# Patient Record
Sex: FEMALE | Race: WHITE | NOT HISPANIC OR LATINO | Employment: OTHER | URBAN - METROPOLITAN AREA
[De-identification: names, ages, dates, MRNs, and addresses within clinical notes are randomized per-mention and may not be internally consistent; named-entity substitution may affect disease eponyms.]

---

## 2017-02-14 ENCOUNTER — HISTORICAL (OUTPATIENT)
Dept: LAB | Facility: HOSPITAL | Age: 82
End: 2017-02-14

## 2017-08-16 ENCOUNTER — HISTORICAL (OUTPATIENT)
Dept: LAB | Facility: HOSPITAL | Age: 82
End: 2017-08-16

## 2017-08-16 LAB
ABS NEUT (OLG): 3.22
ALBUMIN SERPL-MCNC: 3.4 GM/DL (ref 3.4–5)
ALBUMIN/GLOB SERPL: 0.9 RATIO (ref 1.1–2)
ALP SERPL-CCNC: 50 UNIT/L (ref 50–136)
ALT SERPL-CCNC: 21 UNIT/L (ref 12–78)
APPEARANCE, UA: CLEAR
AST SERPL-CCNC: 13 UNIT/L (ref 10–37)
BACTERIA #/AREA URNS AUTO: NORMAL /HPF
BASOPHILS # BLD AUTO: 0.03 X10(3)/MCL
BASOPHILS NFR BLD AUTO: 0.5 %
BILIRUB SERPL-MCNC: 0.6 MG/DL (ref 0.2–1)
BILIRUB UR QL STRIP: NEGATIVE
BILIRUBIN DIRECT+TOT PNL SERPL-MCNC: 0.16 MG/DL (ref 0.05–0.2)
BILIRUBIN DIRECT+TOT PNL SERPL-MCNC: 0.44 MG/DL
BUN SERPL-MCNC: 11 MG/DL (ref 7–18)
CALCIUM SERPL-MCNC: 9.2 MG/DL (ref 8.5–10.1)
CHLORIDE SERPL-SCNC: 105 MMOL/L (ref 98–107)
CHOLEST SERPL-MCNC: 209 MG/DL (ref 50–200)
CHOLEST/HDLC SERPL: 3 {RATIO} (ref 0–5)
CO2 SERPL-SCNC: 30.4 MMOL/L (ref 21–32)
COLOR UR: YELLOW
CREAT SERPL-MCNC: 0.74 MG/DL (ref 0.55–1.02)
EOSINOPHIL # BLD AUTO: 0.15 X10(3)/MCL
EOSINOPHIL NFR BLD AUTO: 2.7 %
ERYTHROCYTE [DISTWIDTH] IN BLOOD BY AUTOMATED COUNT: 13 %
GLOBULIN SER-MCNC: 3.8 GM/DL (ref 2.4–3.5)
GLUCOSE (UA): NEGATIVE
GLUCOSE SERPL-MCNC: 87 MG/DL (ref 74–106)
HCT VFR BLD AUTO: 40.6 % (ref 34–46)
HDLC SERPL-MCNC: 75 MG/DL (ref 35–60)
HGB BLD-MCNC: 13.3 GM/DL (ref 11.3–15.4)
HGB UR QL STRIP: NEGATIVE
IMM GRANULOCYTES # BLD AUTO: 0 10*3/UL (ref 0–0.1)
IMM GRANULOCYTES NFR BLD AUTO: 0 % (ref 0–1)
KETONES UR QL STRIP: NEGATIVE
LDLC SERPL CALC-MCNC: 123 MG/DL (ref 50–140)
LEUKOCYTE ESTERASE UR QL STRIP: NORMAL
LYMPHOCYTES # BLD AUTO: 1.66 X10(3)/MCL
LYMPHOCYTES NFR BLD AUTO: 30.3 %
MCH RBC QN AUTO: 30 PG (ref 27–33)
MCHC RBC AUTO-ENTMCNC: 32.8 GM/DL (ref 32–35)
MCV RBC AUTO: 91.6 FL (ref 81–97)
MONOCYTES # BLD AUTO: 0.42 X10(3)/MCL
MONOCYTES NFR BLD AUTO: 7.7 %
NEUTROPHILS # BLD AUTO: 3.22 X10(3)/MCL
NEUTROPHILS NFR BLD AUTO: 58.8 %
NITRITE UR QL STRIP.AUTO: NEGATIVE
PH UR STRIP: 7 [PH] (ref 4.6–8)
PLATELET # BLD AUTO: 363 X10(3)/MCL (ref 151–368)
PMV BLD AUTO: 10 FL
POTASSIUM SERPL-SCNC: 3.5 MMOL/L (ref 3.5–5.1)
PROT SERPL-MCNC: 7.2 GM/DL (ref 6.4–8.2)
PROT UR QL STRIP: NEGATIVE
RBC # BLD AUTO: 4.43 X10(6)/MCL (ref 3.9–5)
RBC #/AREA URNS HPF: NORMAL /[HPF]
SODIUM SERPL-SCNC: 142 MMOL/L (ref 136–145)
SP GR UR STRIP: 1.01 (ref 1–1.03)
SQUAMOUS EPITHELIAL, UA: NORMAL
TRIGL SERPL-MCNC: 53 MG/DL (ref 30–150)
TSH SERPL-ACNC: 2.09 MIU/ML (ref 0.35–3.75)
UROBILINOGEN UR STRIP-ACNC: 0.2
VLDLC SERPL CALC-MCNC: 11 MG/DL
WBC # SPEC AUTO: 5.48 X10(3)/MCL (ref 3.4–9.2)
WBC #/AREA URNS AUTO: NORMAL /HPF

## 2018-02-14 ENCOUNTER — HISTORICAL (OUTPATIENT)
Dept: LAB | Facility: HOSPITAL | Age: 83
End: 2018-02-14

## 2018-02-14 LAB
ALBUMIN SERPL-MCNC: 3.5 GM/DL (ref 3.4–5)
ALBUMIN/GLOB SERPL: 0.9 RATIO (ref 1.1–2)
ALP SERPL-CCNC: 53 UNIT/L (ref 46–116)
ALT SERPL-CCNC: 21 UNIT/L (ref 12–78)
AST SERPL-CCNC: 15 UNIT/L (ref 10–37)
BILIRUB SERPL-MCNC: 0.5 MG/DL (ref 0.2–1)
BILIRUBIN DIRECT+TOT PNL SERPL-MCNC: 0.1 MG/DL (ref 0–0.2)
BILIRUBIN DIRECT+TOT PNL SERPL-MCNC: 0.37 MG/DL
BUN SERPL-MCNC: 10 MG/DL (ref 7–18)
CALCIUM SERPL-MCNC: 9.7 MG/DL (ref 8.5–10.1)
CHLORIDE SERPL-SCNC: 104 MMOL/L (ref 98–107)
CHOLEST SERPL-MCNC: 200 MG/DL (ref 50–200)
CHOLEST/HDLC SERPL: 3 {RATIO} (ref 0–5)
CO2 SERPL-SCNC: 28.2 MMOL/L (ref 21–32)
CREAT SERPL-MCNC: 0.92 MG/DL (ref 0.55–1.02)
GLOBULIN SER-MCNC: 4 GM/DL (ref 2.4–3.5)
GLUCOSE SERPL-MCNC: 93 MG/DL (ref 74–106)
HDLC SERPL-MCNC: 72 MG/DL (ref 35–60)
LDLC SERPL CALC-MCNC: 114.2 MG/DL (ref 50–140)
POTASSIUM SERPL-SCNC: 3.9 MMOL/L (ref 3.5–5.1)
PROT SERPL-MCNC: 7.5 GM/DL (ref 6.4–8.2)
SODIUM SERPL-SCNC: 140 MMOL/L (ref 136–145)
TRIGL SERPL-MCNC: 69 MG/DL (ref 30–150)
TSH SERPL-ACNC: 4.1 MIU/ML (ref 0.35–3.75)
VLDLC SERPL CALC-MCNC: 14 MG/DL

## 2018-04-27 ENCOUNTER — HISTORICAL (OUTPATIENT)
Dept: LAB | Facility: HOSPITAL | Age: 83
End: 2018-04-27

## 2018-08-15 ENCOUNTER — HISTORICAL (OUTPATIENT)
Dept: LAB | Facility: HOSPITAL | Age: 83
End: 2018-08-15

## 2018-08-15 LAB
ABS NEUT (OLG): 3.15
ALBUMIN SERPL-MCNC: 3.4 GM/DL (ref 3.4–5)
ALBUMIN/GLOB SERPL: 0.9 RATIO (ref 1.1–2)
ALP SERPL-CCNC: 65 UNIT/L (ref 46–116)
ALT SERPL-CCNC: 20 UNIT/L (ref 12–78)
APPEARANCE, UA: CLEAR
AST SERPL-CCNC: 15 UNIT/L (ref 10–37)
BACTERIA SPEC CULT: ABNORMAL
BASOPHILS # BLD AUTO: 0.04 X10(3)/MCL
BASOPHILS NFR BLD AUTO: 0.7 %
BILIRUB SERPL-MCNC: 0.4 MG/DL (ref 0.2–1)
BILIRUB UR QL STRIP: NEGATIVE
BILIRUBIN DIRECT+TOT PNL SERPL-MCNC: 0.11 MG/DL (ref 0–0.2)
BILIRUBIN DIRECT+TOT PNL SERPL-MCNC: 0.29 MG/DL
BUN SERPL-MCNC: 11 MG/DL (ref 7–18)
CALCIUM SERPL-MCNC: 9.2 MG/DL (ref 8.5–10.1)
CHLORIDE SERPL-SCNC: 106 MMOL/L (ref 98–107)
CHOLEST SERPL-MCNC: 183 MG/DL (ref 50–200)
CHOLEST/HDLC SERPL: 2 {RATIO} (ref 0–5)
CO2 SERPL-SCNC: 27.9 MMOL/L (ref 21–32)
COLOR UR: YELLOW
CREAT SERPL-MCNC: 0.83 MG/DL (ref 0.55–1.02)
EOSINOPHIL # BLD AUTO: 0.19 X10(3)/MCL
EOSINOPHIL NFR BLD AUTO: 3.3 %
ERYTHROCYTE [DISTWIDTH] IN BLOOD BY AUTOMATED COUNT: 13 %
GLOBULIN SER-MCNC: 3.7 GM/DL (ref 2.4–3.5)
GLUCOSE (UA): NEGATIVE
GLUCOSE SERPL-MCNC: 82 MG/DL (ref 74–106)
HCT VFR BLD AUTO: 40.8 % (ref 34–46)
HDLC SERPL-MCNC: 76 MG/DL (ref 35–60)
HGB BLD-MCNC: 13.8 GM/DL (ref 11.3–15.4)
HGB UR QL STRIP: ABNORMAL
IMM GRANULOCYTES # BLD AUTO: 0.04 10*3/UL (ref 0–0.1)
IMM GRANULOCYTES NFR BLD AUTO: 0.7 % (ref 0–1)
KETONES UR QL STRIP: NEGATIVE
LDLC SERPL CALC-MCNC: 96 MG/DL (ref 50–140)
LEUKOCYTE ESTERASE UR QL STRIP: NEGATIVE
LYMPHOCYTES # BLD AUTO: 1.77 X10(3)/MCL
LYMPHOCYTES NFR BLD AUTO: 30.8 %
MCH RBC QN AUTO: 31.2 PG (ref 27–33)
MCHC RBC AUTO-ENTMCNC: 33.8 GM/DL (ref 32–35)
MCV RBC AUTO: 92.1 FL (ref 81–97)
MONOCYTES # BLD AUTO: 0.55 X10(3)/MCL
MONOCYTES NFR BLD AUTO: 9.6 %
NEUTROPHILS # BLD AUTO: 3.15 X10(3)/MCL
NEUTROPHILS NFR BLD AUTO: 54.9 %
NITRITE UR QL STRIP: NEGATIVE
PH UR STRIP: 6 [PH] (ref 4.6–8)
PLATELET # BLD AUTO: 361 X10(3)/MCL (ref 151–368)
PMV BLD AUTO: 9 FL
POTASSIUM SERPL-SCNC: 3.8 MMOL/L (ref 3.5–5.1)
PROT SERPL-MCNC: 7.1 GM/DL (ref 6.4–8.2)
PROT UR QL STRIP: NEGATIVE
RBC # BLD AUTO: 4.43 X10(6)/MCL (ref 3.9–5)
RBC #/AREA URNS HPF: ABNORMAL /[HPF]
SODIUM SERPL-SCNC: 141 MMOL/L (ref 136–145)
SP GR UR STRIP: 1.01 (ref 1–1.03)
SQUAMOUS EPITHELIAL, UA: ABNORMAL
T4 FREE SERPL-MCNC: 1.15 NG/DL (ref 0.76–1.46)
TRIGL SERPL-MCNC: 56 MG/DL (ref 30–150)
TSH SERPL-ACNC: 0.35 MIU/ML (ref 0.35–3.75)
UROBILINOGEN UR STRIP-ACNC: 0.2
VLDLC SERPL CALC-MCNC: 11 MG/DL
WBC # SPEC AUTO: 5.74 X10(3)/MCL (ref 3.4–9.2)
WBC #/AREA URNS HPF: ABNORMAL /HPF

## 2019-02-12 ENCOUNTER — HISTORICAL (OUTPATIENT)
Dept: LAB | Facility: HOSPITAL | Age: 84
End: 2019-02-12

## 2019-02-12 LAB
ALBUMIN SERPL-MCNC: 3.5 GM/DL (ref 3.4–5)
ALBUMIN/GLOB SERPL: 1 RATIO (ref 1.1–2)
ALP SERPL-CCNC: 56 UNIT/L (ref 46–116)
ALT SERPL-CCNC: 10 UNIT/L (ref 12–78)
AST SERPL-CCNC: 12 UNIT/L (ref 10–37)
BILIRUB SERPL-MCNC: 0.8 MG/DL (ref 0.2–1)
BILIRUBIN DIRECT+TOT PNL SERPL-MCNC: 0.18 MG/DL (ref 0–0.2)
BILIRUBIN DIRECT+TOT PNL SERPL-MCNC: 0.62 MG/DL
BUN SERPL-MCNC: 13 MG/DL (ref 7–18)
CALCIUM SERPL-MCNC: 9.6 MG/DL (ref 8.5–10.1)
CHLORIDE SERPL-SCNC: 106 MMOL/L (ref 98–107)
CHOLEST SERPL-MCNC: 209 MG/DL (ref 50–200)
CHOLEST/HDLC SERPL: 3 {RATIO} (ref 0–5)
CO2 SERPL-SCNC: 31.2 MMOL/L (ref 21–32)
CREAT SERPL-MCNC: 0.91 MG/DL (ref 0.55–1.02)
GLOBULIN SER-MCNC: 3.5 GM/DL (ref 2.4–3.5)
GLUCOSE SERPL-MCNC: 88 MG/DL (ref 74–106)
HDLC SERPL-MCNC: 68 MG/DL (ref 35–60)
LDLC SERPL CALC-MCNC: 126 MG/DL (ref 50–140)
POTASSIUM SERPL-SCNC: 3.9 MMOL/L (ref 3.5–5.1)
PROT SERPL-MCNC: 7 GM/DL (ref 6.4–8.2)
SODIUM SERPL-SCNC: 143 MMOL/L (ref 136–145)
T4 FREE SERPL-MCNC: 1.19 NG/DL (ref 0.76–1.46)
TRIGL SERPL-MCNC: 74 MG/DL (ref 30–150)
TSH SERPL-ACNC: 1.08 MIU/ML (ref 0.35–3.75)
VLDLC SERPL CALC-MCNC: 15 MG/DL

## 2019-08-16 ENCOUNTER — HISTORICAL (OUTPATIENT)
Dept: LAB | Facility: HOSPITAL | Age: 84
End: 2019-08-16

## 2019-08-16 LAB
ABS NEUT (OLG): 3.02
ALBUMIN SERPL-MCNC: 3.5 GM/DL (ref 3.4–5)
ALBUMIN/GLOB SERPL: 1 RATIO (ref 1.1–2)
ALP SERPL-CCNC: 59 UNIT/L (ref 46–116)
ALT SERPL-CCNC: 13 UNIT/L (ref 12–78)
AST SERPL-CCNC: 17 UNIT/L (ref 10–37)
BASOPHILS # BLD AUTO: 0.03 X10(3)/MCL
BASOPHILS NFR BLD AUTO: 0.6 %
BILIRUB SERPL-MCNC: 0.6 MG/DL (ref 0.2–1)
BILIRUBIN DIRECT+TOT PNL SERPL-MCNC: 0.16 MG/DL (ref 0–0.2)
BILIRUBIN DIRECT+TOT PNL SERPL-MCNC: 0.44 MG/DL
BUN SERPL-MCNC: 8 MG/DL (ref 7–18)
CALCIUM SERPL-MCNC: 9.7 MG/DL (ref 8.5–10.1)
CHLORIDE SERPL-SCNC: 107 MMOL/L (ref 98–107)
CHOLEST SERPL-MCNC: 209 MG/DL (ref 50–200)
CHOLEST/HDLC SERPL: 3 {RATIO} (ref 0–5)
CO2 SERPL-SCNC: 28.8 MMOL/L (ref 21–32)
CREAT SERPL-MCNC: 0.82 MG/DL (ref 0.55–1.02)
EOSINOPHIL # BLD AUTO: 0.24 X10(3)/MCL
EOSINOPHIL NFR BLD AUTO: 4.5 %
ERYTHROCYTE [DISTWIDTH] IN BLOOD BY AUTOMATED COUNT: 13 %
GLOBULIN SER-MCNC: 3.6 GM/DL (ref 2.4–3.5)
GLUCOSE SERPL-MCNC: 87 MG/DL (ref 74–106)
HCT VFR BLD AUTO: 41.3 % (ref 34–46)
HDLC SERPL-MCNC: 70 MG/DL (ref 35–60)
HGB BLD-MCNC: 13.7 GM/DL (ref 11.3–15.4)
IMM GRANULOCYTES # BLD AUTO: 0.01 10*3/UL (ref 0–0.1)
IMM GRANULOCYTES NFR BLD AUTO: 0.2 % (ref 0–1)
LDLC SERPL CALC-MCNC: 127 MG/DL (ref 50–140)
LYMPHOCYTES # BLD AUTO: 1.64 X10(3)/MCL
LYMPHOCYTES NFR BLD AUTO: 30.9 %
MCH RBC QN AUTO: 30.7 PG (ref 27–33)
MCHC RBC AUTO-ENTMCNC: 33.2 GM/DL (ref 32–35)
MCV RBC AUTO: 92.6 FL (ref 81–97)
MONOCYTES # BLD AUTO: 0.36 X10(3)/MCL
MONOCYTES NFR BLD AUTO: 6.8 %
NEUTROPHILS # BLD AUTO: 3.02 X10(3)/MCL
NEUTROPHILS NFR BLD AUTO: 57 %
PLATELET # BLD AUTO: 361 X10(3)/MCL (ref 140–450)
PMV BLD AUTO: 9 FL
POTASSIUM SERPL-SCNC: 4 MMOL/L (ref 3.5–5.1)
PROT SERPL-MCNC: 7.1 GM/DL (ref 6.4–8.2)
RBC # BLD AUTO: 4.46 X10(6)/MCL (ref 3.9–5)
SODIUM SERPL-SCNC: 143 MMOL/L (ref 136–145)
T4 FREE SERPL-MCNC: 1.15 NG/DL (ref 0.76–1.46)
TRIGL SERPL-MCNC: 59 MG/DL (ref 30–150)
TSH SERPL-ACNC: 2.41 MIU/ML (ref 0.35–3.75)
VLDLC SERPL CALC-MCNC: 12 MG/DL
WBC # SPEC AUTO: 5.3 X10(3)/MCL (ref 3.4–9.2)

## 2019-08-27 ENCOUNTER — HISTORICAL (OUTPATIENT)
Dept: RADIOLOGY | Facility: HOSPITAL | Age: 84
End: 2019-08-27

## 2020-02-11 ENCOUNTER — HISTORICAL (OUTPATIENT)
Dept: LAB | Facility: HOSPITAL | Age: 85
End: 2020-02-11

## 2020-02-11 LAB
ALBUMIN SERPL-MCNC: 4 GM/DL (ref 3.2–4.6)
ALBUMIN/GLOB SERPL: 1.1 RATIO (ref 1.1–2)
ALP SERPL-CCNC: 49 UNIT/L (ref 40–150)
ALT SERPL-CCNC: 12 UNIT/L (ref 0–55)
AST SERPL-CCNC: 14 UNIT/L (ref 5–34)
BILIRUB SERPL-MCNC: 0.5 MG/DL
BILIRUBIN DIRECT+TOT PNL SERPL-MCNC: 0.2 MG/DL (ref 0–0.5)
BILIRUBIN DIRECT+TOT PNL SERPL-MCNC: 0.3 MG/DL
BUN SERPL-MCNC: 8 MG/DL (ref 9.8–20.1)
CALCIUM SERPL-MCNC: 10.1 MG/DL (ref 8.4–10.2)
CHLORIDE SERPL-SCNC: 104 MMOL/L (ref 98–107)
CHOLEST SERPL-MCNC: 236 MG/DL
CHOLEST/HDLC SERPL: 4 {RATIO} (ref 0–5)
CO2 SERPL-SCNC: 29 MEQ/L (ref 23–31)
CREAT SERPL-MCNC: 0.84 MG/DL (ref 0.55–1.02)
GLOBULIN SER-MCNC: 3.5 GM/DL (ref 2.4–3.5)
GLUCOSE SERPL-MCNC: 93 MG/DL (ref 82–115)
HDLC SERPL-MCNC: 64 MG/DL
LDLC SERPL CALC-MCNC: 148 MG/DL (ref 50–140)
POTASSIUM SERPL-SCNC: 4.4 MMOL/L (ref 3.5–5.1)
PROT SERPL-MCNC: 7.5 GM/DL (ref 5.8–7.6)
SODIUM SERPL-SCNC: 143 MMOL/L (ref 136–145)
T4 FREE SERPL-MCNC: 1.17 NG/DL (ref 0.76–1.46)
TRIGL SERPL-MCNC: 122 MG/DL (ref 37–140)
TSH SERPL-ACNC: 3.74 UIU/ML (ref 0.35–4.94)
VLDLC SERPL CALC-MCNC: 24 MG/DL

## 2020-08-10 ENCOUNTER — HISTORICAL (OUTPATIENT)
Dept: LAB | Facility: HOSPITAL | Age: 85
End: 2020-08-10

## 2020-08-10 LAB
ABS NEUT (OLG): 4.15
ALBUMIN SERPL-MCNC: 3.7 GM/DL (ref 3.4–4.8)
ALBUMIN/GLOB SERPL: 1.2 RATIO (ref 1.1–2)
ALP SERPL-CCNC: 47 UNIT/L (ref 40–150)
ALT SERPL-CCNC: 10 UNIT/L (ref 0–55)
AST SERPL-CCNC: 13 UNIT/L (ref 5–34)
BASOPHILS # BLD AUTO: 0.04 X10(3)/MCL
BASOPHILS NFR BLD AUTO: 0.6 %
BILIRUB SERPL-MCNC: 0.6 MG/DL
BILIRUBIN DIRECT+TOT PNL SERPL-MCNC: 0.2 MG/DL (ref 0–0.5)
BILIRUBIN DIRECT+TOT PNL SERPL-MCNC: 0.4 MG/DL
BUN SERPL-MCNC: 13 MG/DL (ref 9.8–20.1)
CALCIUM SERPL-MCNC: 9.8 MG/DL (ref 8.4–10.2)
CHLORIDE SERPL-SCNC: 106 MMOL/L (ref 98–107)
CHOLEST SERPL-MCNC: 212 MG/DL
CHOLEST/HDLC SERPL: 3 {RATIO} (ref 0–5)
CO2 SERPL-SCNC: 30 MEQ/L (ref 23–31)
CREAT SERPL-MCNC: 0.8 MG/DL (ref 0.55–1.02)
EOSINOPHIL # BLD AUTO: 0.29 X10(3)/MCL
EOSINOPHIL NFR BLD AUTO: 4.1 %
ERYTHROCYTE [DISTWIDTH] IN BLOOD BY AUTOMATED COUNT: 12 %
GLOBULIN SER-MCNC: 3.1 GM/DL (ref 2.4–3.5)
GLUCOSE SERPL-MCNC: 94 MG/DL (ref 82–115)
HCT VFR BLD AUTO: 41.3 % (ref 34–46)
HDLC SERPL-MCNC: 61 MG/DL (ref 35–60)
HGB BLD-MCNC: 13.1 GM/DL (ref 11.3–15.4)
IMM GRANULOCYTES # BLD AUTO: 0.02 10*3/UL (ref 0–0.1)
IMM GRANULOCYTES NFR BLD AUTO: 0.3 % (ref 0–1)
LDLC SERPL CALC-MCNC: 130 MG/DL (ref 50–140)
LYMPHOCYTES # BLD AUTO: 1.88 X10(3)/MCL
LYMPHOCYTES NFR BLD AUTO: 26.8 %
MCH RBC QN AUTO: 29.9 PG (ref 27–33)
MCHC RBC AUTO-ENTMCNC: 31.7 GM/DL (ref 32–35)
MCV RBC AUTO: 94.3 FL (ref 81–97)
MONOCYTES # BLD AUTO: 0.63 X10(3)/MCL
MONOCYTES NFR BLD AUTO: 9 %
NEUTROPHILS # BLD AUTO: 4.15 X10(3)/MCL
NEUTROPHILS NFR BLD AUTO: 59.2 %
PLATELET # BLD AUTO: 409 X10(3)/MCL (ref 140–450)
PMV BLD AUTO: 9 FL
POTASSIUM SERPL-SCNC: 3.9 MMOL/L (ref 3.5–5.1)
PROT SERPL-MCNC: 6.8 GM/DL (ref 5.8–7.6)
RBC # BLD AUTO: 4.38 X10(6)/MCL (ref 3.9–5)
SODIUM SERPL-SCNC: 144 MMOL/L (ref 136–145)
T4 FREE SERPL-MCNC: 1.18 NG/DL (ref 0.7–1.48)
TRIGL SERPL-MCNC: 104 MG/DL (ref 37–140)
TSH SERPL-ACNC: 1.08 UIU/ML (ref 0.35–4.94)
VLDLC SERPL CALC-MCNC: 21 MG/DL
WBC # SPEC AUTO: 7.01 X10(3)/MCL (ref 3.4–9.2)

## 2021-02-09 ENCOUNTER — HISTORICAL (OUTPATIENT)
Dept: LAB | Facility: HOSPITAL | Age: 86
End: 2021-02-09

## 2021-02-09 LAB
ALBUMIN SERPL-MCNC: 3.7 GM/DL (ref 3.4–4.8)
ALBUMIN/GLOB SERPL: 1.1 RATIO (ref 1.1–2)
ALP SERPL-CCNC: 60 UNIT/L (ref 40–150)
ALT SERPL-CCNC: 10 UNIT/L (ref 0–55)
AST SERPL-CCNC: 14 UNIT/L (ref 5–34)
BILIRUB SERPL-MCNC: 0.6 MG/DL
BILIRUBIN DIRECT+TOT PNL SERPL-MCNC: 0.3 MG/DL
BILIRUBIN DIRECT+TOT PNL SERPL-MCNC: 0.3 MG/DL (ref 0–0.5)
BUN SERPL-MCNC: 12 MG/DL (ref 9.8–20.1)
CALCIUM SERPL-MCNC: 9.3 MG/DL (ref 8.4–10.2)
CHLORIDE SERPL-SCNC: 106 MMOL/L (ref 98–107)
CHOLEST SERPL-MCNC: 218 MG/DL
CHOLEST/HDLC SERPL: 3 {RATIO} (ref 0–5)
CO2 SERPL-SCNC: 30 MEQ/L (ref 23–31)
CREAT SERPL-MCNC: 0.79 MG/DL (ref 0.55–1.02)
GLOBULIN SER-MCNC: 3.3 GM/DL (ref 2.4–3.5)
GLUCOSE SERPL-MCNC: 85 MG/DL (ref 82–115)
HDLC SERPL-MCNC: 64 MG/DL (ref 35–60)
LDLC SERPL CALC-MCNC: 138 MG/DL (ref 50–140)
POTASSIUM SERPL-SCNC: 3.9 MMOL/L (ref 3.5–5.1)
PROT SERPL-MCNC: 7 GM/DL (ref 5.8–7.6)
SODIUM SERPL-SCNC: 144 MMOL/L (ref 136–145)
T4 FREE SERPL-MCNC: 1.06 NG/DL (ref 0.7–1.48)
TRIGL SERPL-MCNC: 80 MG/DL (ref 37–140)
TSH SERPL-ACNC: 3.45 UIU/ML (ref 0.35–4.94)
VLDLC SERPL CALC-MCNC: 16 MG/DL

## 2021-08-26 ENCOUNTER — HISTORICAL (OUTPATIENT)
Dept: LAB | Facility: HOSPITAL | Age: 86
End: 2021-08-26

## 2021-08-26 LAB
ABS NEUT (OLG): 2.61
ALBUMIN SERPL-MCNC: 3.7 GM/DL (ref 3.4–4.8)
ALBUMIN/GLOB SERPL: 1.1 RATIO (ref 1.1–2)
ALP SERPL-CCNC: 48 UNIT/L (ref 40–150)
ALT SERPL-CCNC: 13 UNIT/L (ref 0–55)
AST SERPL-CCNC: 14 UNIT/L (ref 5–34)
BASOPHILS # BLD AUTO: 0.04 X10(3)/MCL
BASOPHILS NFR BLD AUTO: 0.8 %
BILIRUB SERPL-MCNC: 0.5 MG/DL
BILIRUBIN DIRECT+TOT PNL SERPL-MCNC: 0.2 MG/DL (ref 0–0.5)
BILIRUBIN DIRECT+TOT PNL SERPL-MCNC: 0.3 MG/DL
BUN SERPL-MCNC: 13 MG/DL (ref 9.8–20.1)
CALCIUM SERPL-MCNC: 9.5 MG/DL (ref 8.4–10.2)
CHLORIDE SERPL-SCNC: 105 MMOL/L (ref 98–107)
CHOLEST SERPL-MCNC: 200 MG/DL
CHOLEST/HDLC SERPL: 3 {RATIO} (ref 0–5)
CO2 SERPL-SCNC: 30 MEQ/L (ref 23–31)
CREAT SERPL-MCNC: 0.83 MG/DL (ref 0.55–1.02)
EOSINOPHIL # BLD AUTO: 0.23 X10(3)/MCL
EOSINOPHIL NFR BLD AUTO: 4.4 %
ERYTHROCYTE [DISTWIDTH] IN BLOOD BY AUTOMATED COUNT: 13 %
GLOBULIN SER-MCNC: 3.4 GM/DL (ref 2.4–3.5)
GLUCOSE SERPL-MCNC: 88 MG/DL (ref 82–115)
HCT VFR BLD AUTO: 42 % (ref 34–46)
HDLC SERPL-MCNC: 63 MG/DL (ref 35–60)
HGB BLD-MCNC: 13.2 GM/DL (ref 11.3–15.4)
IMM GRANULOCYTES # BLD AUTO: 0.02 10*3/UL (ref 0–0.1)
IMM GRANULOCYTES NFR BLD AUTO: 0.4 % (ref 0–1)
LDLC SERPL CALC-MCNC: 126 MG/DL (ref 50–140)
LYMPHOCYTES # BLD AUTO: 1.83 X10(3)/MCL
LYMPHOCYTES NFR BLD AUTO: 35 %
MCH RBC QN AUTO: 29.5 PG (ref 27–33)
MCHC RBC AUTO-ENTMCNC: 31.4 GM/DL (ref 32–35)
MCV RBC AUTO: 94 FL (ref 81–97)
MONOCYTES # BLD AUTO: 0.5 X10(3)/MCL
MONOCYTES NFR BLD AUTO: 9.6 %
NEUTROPHILS # BLD AUTO: 2.61 X10(3)/MCL
NEUTROPHILS NFR BLD AUTO: 49.8 %
PLATELET # BLD AUTO: 363 X10(3)/MCL (ref 140–450)
PMV BLD AUTO: 9 FL
POTASSIUM SERPL-SCNC: 4 MMOL/L (ref 3.5–5.1)
PROT SERPL-MCNC: 7.1 GM/DL (ref 5.8–7.6)
RBC # BLD AUTO: 4.47 X10(6)/MCL (ref 3.9–5)
SODIUM SERPL-SCNC: 143 MMOL/L (ref 136–145)
T4 FREE SERPL-MCNC: 1.03 NG/DL (ref 0.7–1.48)
TRIGL SERPL-MCNC: 54 MG/DL (ref 37–140)
TSH SERPL-ACNC: 2.33 UIU/ML (ref 0.35–4.94)
VLDLC SERPL CALC-MCNC: 11 MG/DL
WBC # SPEC AUTO: 5.23 X10(3)/MCL (ref 3.4–9.2)

## 2022-03-03 ENCOUNTER — HISTORICAL (OUTPATIENT)
Dept: LAB | Facility: HOSPITAL | Age: 87
End: 2022-03-03

## 2022-03-03 LAB
ABS NEUT (OLG): 3.81
ALBUMIN SERPL-MCNC: 3.7 G/DL (ref 3.4–4.8)
ALBUMIN/GLOB SERPL: 1.1 {RATIO} (ref 1.1–2)
ALP SERPL-CCNC: 51 U/L (ref 40–150)
ALT SERPL-CCNC: 13 U/L (ref 0–55)
AST SERPL-CCNC: 15 U/L (ref 5–34)
BASOPHILS # BLD AUTO: 0.05 10*3/UL
BASOPHILS NFR BLD AUTO: 0.8 %
BILIRUB SERPL-MCNC: 0.6 MG/DL
BILIRUBIN DIRECT+TOT PNL SERPL-MCNC: 0.2 (ref 0–0.5)
BILIRUBIN DIRECT+TOT PNL SERPL-MCNC: 0.4
BUN SERPL-MCNC: 11 MG/DL (ref 9.8–20.1)
CALCIUM SERPL-MCNC: 9.5 MG/DL (ref 8.7–10.5)
CHLORIDE SERPL-SCNC: 106 MMOL/L (ref 98–107)
CHOLEST SERPL-MCNC: 238 MG/DL
CHOLEST/HDLC SERPL: 4 {RATIO} (ref 0–5)
CO2 SERPL-SCNC: 29 MMOL/L (ref 23–31)
CREAT SERPL-MCNC: 0.78 MG/DL (ref 0.55–1.02)
EOSINOPHIL # BLD AUTO: 0.22 10*3/UL
EOSINOPHIL NFR BLD AUTO: 3.6 %
ERYTHROCYTE [DISTWIDTH] IN BLOOD BY AUTOMATED COUNT: 13 %
GLOBULIN SER-MCNC: 3.4 G/DL (ref 2.4–3.5)
GLUCOSE SERPL-MCNC: 96 MG/DL (ref 82–115)
HCT VFR BLD AUTO: 40.9 % (ref 34–46)
HDLC SERPL-MCNC: 65 MG/DL (ref 35–60)
HEMOLYSIS INTERF INDEX SERPL-ACNC: 2
HGB BLD-MCNC: 13.3 G/DL (ref 11.3–15.4)
ICTERIC INTERF INDEX SERPL-ACNC: 1
IMM GRANULOCYTES # BLD AUTO: 0.02 10*3/UL (ref 0–0.1)
IMM GRANULOCYTES NFR BLD AUTO: 0.3 % (ref 0–1)
LDLC SERPL CALC-MCNC: 156 MG/DL (ref 50–140)
LIPEMIC INTERF INDEX SERPL-ACNC: -1
LYMPHOCYTES # BLD AUTO: 1.57 10*3/UL
LYMPHOCYTES NFR BLD AUTO: 25.4 %
MANUAL DIFF? (OHS): NO
MCH RBC QN AUTO: 30.1 PG (ref 27–33)
MCHC RBC AUTO-ENTMCNC: 32.5 G/DL (ref 32–35)
MCV RBC AUTO: 92.5 FL (ref 81–97)
MONOCYTES # BLD AUTO: 0.5 10*3/UL
MONOCYTES NFR BLD AUTO: 8.1 %
NEUTROPHILS # BLD AUTO: 3.81 10*3/UL
NEUTROPHILS NFR BLD AUTO: 61.8 %
PLATELET # BLD AUTO: 378 10*3/UL (ref 140–450)
PMV BLD AUTO: 9 FL
POTASSIUM SERPL-SCNC: 3.9 MMOL/L (ref 3.5–5.1)
PROT SERPL-MCNC: 7.1 G/DL (ref 5.8–7.6)
RBC # BLD AUTO: 4.42 10*6/UL (ref 3.9–5)
SODIUM SERPL-SCNC: 142 MMOL/L (ref 136–145)
T4 FREE SERPL-MCNC: 1.09 NG/DL (ref 0.7–1.48)
TRIGL SERPL-MCNC: 83 MG/DL (ref 37–140)
TSH SERPL-ACNC: 3.15 M[IU]/L (ref 0.35–4.94)
VLDLC SERPL CALC-MCNC: 17 MG/DL
WBC # SPEC AUTO: 6.17 10*3/UL (ref 3.4–9.2)

## 2022-09-01 ENCOUNTER — LAB VISIT (OUTPATIENT)
Dept: LAB | Facility: HOSPITAL | Age: 87
End: 2022-09-01
Attending: INTERNAL MEDICINE
Payer: MEDICARE

## 2022-09-01 DIAGNOSIS — E03.9 HYPOTHYROIDISM, ADULT: Primary | ICD-10-CM

## 2022-09-01 DIAGNOSIS — F41.9 ANXIETY: ICD-10-CM

## 2022-09-01 DIAGNOSIS — K21.9 GASTROESOPHAGEAL REFLUX DISEASE, UNSPECIFIED WHETHER ESOPHAGITIS PRESENT: ICD-10-CM

## 2022-09-01 DIAGNOSIS — E78.2 MIXED HYPERLIPIDEMIA: ICD-10-CM

## 2022-09-01 LAB
ALBUMIN SERPL-MCNC: 3.7 GM/DL (ref 3.4–4.8)
ALBUMIN/GLOB SERPL: 1.1 RATIO (ref 1.1–2)
ALP SERPL-CCNC: 51 UNIT/L (ref 40–150)
ALT SERPL-CCNC: 8 UNIT/L (ref 0–55)
AST SERPL-CCNC: 13 UNIT/L (ref 5–34)
BASOPHILS # BLD AUTO: 0.06 X10(3)/MCL (ref 0–0.2)
BASOPHILS NFR BLD AUTO: 0.9 %
BILIRUBIN DIRECT+TOT PNL SERPL-MCNC: 0.8 MG/DL
BUN SERPL-MCNC: 14 MG/DL (ref 9.8–20.1)
CALCIUM SERPL-MCNC: 10.4 MG/DL (ref 8.4–10.2)
CHLORIDE SERPL-SCNC: 106 MMOL/L (ref 98–107)
CHOLEST SERPL-MCNC: 220 MG/DL
CHOLEST/HDLC SERPL: 3 {RATIO} (ref 0–5)
CO2 SERPL-SCNC: 31 MMOL/L (ref 23–31)
CREAT SERPL-MCNC: 0.84 MG/DL (ref 0.55–1.02)
EOSINOPHIL # BLD AUTO: 0.22 X10(3)/MCL (ref 0–0.9)
EOSINOPHIL NFR BLD AUTO: 3.4 %
ERYTHROCYTE [DISTWIDTH] IN BLOOD BY AUTOMATED COUNT: 12.7 % (ref 11.5–17)
GFR SERPLBLD CREATININE-BSD FMLA CKD-EPI: >60 MLS/MIN/1.73/M2
GLOBULIN SER-MCNC: 3.5 GM/DL (ref 2.4–3.5)
GLUCOSE SERPL-MCNC: 94 MG/DL (ref 82–115)
HCT VFR BLD AUTO: 40.5 % (ref 37–47)
HDLC SERPL-MCNC: 68 MG/DL (ref 35–60)
HGB BLD-MCNC: 13.4 GM/DL (ref 12–16)
IMM GRANULOCYTES # BLD AUTO: 0.03 X10(3)/MCL (ref 0–0.04)
IMM GRANULOCYTES NFR BLD AUTO: 0.5 %
LDLC SERPL CALC-MCNC: 137 MG/DL (ref 50–140)
LYMPHOCYTES # BLD AUTO: 1.5 X10(3)/MCL (ref 0.6–4.6)
LYMPHOCYTES NFR BLD AUTO: 23.5 %
MCH RBC QN AUTO: 30.3 PG (ref 27–31)
MCHC RBC AUTO-ENTMCNC: 33.1 MG/DL (ref 33–36)
MCV RBC AUTO: 91.6 FL (ref 80–94)
MONOCYTES # BLD AUTO: 0.55 X10(3)/MCL (ref 0.1–1.3)
MONOCYTES NFR BLD AUTO: 8.6 %
NEUTROPHILS # BLD AUTO: 4 X10(3)/MCL (ref 2.1–9.2)
NEUTROPHILS NFR BLD AUTO: 63.1 %
NRBC BLD AUTO-RTO: 0 %
PLATELET # BLD AUTO: 370 X10(3)/MCL (ref 130–400)
PMV BLD AUTO: 9.4 FL (ref 7.4–10.4)
POTASSIUM SERPL-SCNC: 4.5 MMOL/L (ref 3.5–5.1)
PROT SERPL-MCNC: 7.2 GM/DL (ref 5.8–7.6)
RBC # BLD AUTO: 4.42 X10(6)/MCL (ref 4.2–5.4)
SODIUM SERPL-SCNC: 143 MMOL/L (ref 136–145)
T4 FREE SERPL-MCNC: 1.15 NG/DL (ref 0.7–1.48)
TRIGL SERPL-MCNC: 73 MG/DL (ref 37–140)
TSH SERPL-ACNC: 1.03 UIU/ML (ref 0.35–4.94)
VLDLC SERPL CALC-MCNC: 15 MG/DL
WBC # SPEC AUTO: 6.4 X10(3)/MCL (ref 4.5–11.5)

## 2022-09-01 PROCEDURE — 85025 COMPLETE CBC W/AUTO DIFF WBC: CPT

## 2022-09-01 PROCEDURE — 36415 COLL VENOUS BLD VENIPUNCTURE: CPT

## 2022-09-01 PROCEDURE — 80053 COMPREHEN METABOLIC PANEL: CPT

## 2022-09-01 PROCEDURE — 84439 ASSAY OF FREE THYROXINE: CPT

## 2022-09-01 PROCEDURE — 80061 LIPID PANEL: CPT

## 2022-09-01 PROCEDURE — 84443 ASSAY THYROID STIM HORMONE: CPT

## 2023-03-02 ENCOUNTER — LAB VISIT (OUTPATIENT)
Dept: LAB | Facility: HOSPITAL | Age: 88
End: 2023-03-02
Attending: INTERNAL MEDICINE
Payer: MEDICARE

## 2023-03-02 DIAGNOSIS — E03.9 MYXEDEMA HEART DISEASE: Primary | ICD-10-CM

## 2023-03-02 DIAGNOSIS — K21.9 GASTROESOPHAGEAL REFLUX DISEASE, UNSPECIFIED WHETHER ESOPHAGITIS PRESENT: ICD-10-CM

## 2023-03-02 DIAGNOSIS — E78.2 MIXED HYPERLIPIDEMIA: ICD-10-CM

## 2023-03-02 DIAGNOSIS — I51.9 MYXEDEMA HEART DISEASE: Primary | ICD-10-CM

## 2023-03-02 LAB
ALBUMIN SERPL-MCNC: 3.8 G/DL (ref 3.4–4.8)
ALBUMIN/GLOB SERPL: 1.1 RATIO (ref 1.1–2)
ALP SERPL-CCNC: 51 UNIT/L (ref 40–150)
ALT SERPL-CCNC: 8 UNIT/L (ref 0–55)
AST SERPL-CCNC: 13 UNIT/L (ref 5–34)
BILIRUBIN DIRECT+TOT PNL SERPL-MCNC: 0.8 MG/DL
BUN SERPL-MCNC: 11 MG/DL (ref 9.8–20.1)
CALCIUM SERPL-MCNC: 9.9 MG/DL (ref 8.4–10.2)
CHLORIDE SERPL-SCNC: 105 MMOL/L (ref 98–107)
CHOLEST SERPL-MCNC: 230 MG/DL
CHOLEST/HDLC SERPL: 3 {RATIO} (ref 0–5)
CO2 SERPL-SCNC: 27 MMOL/L (ref 23–31)
CREAT SERPL-MCNC: 0.85 MG/DL (ref 0.55–1.02)
GFR SERPLBLD CREATININE-BSD FMLA CKD-EPI: >60 MLS/MIN/1.73/M2
GLOBULIN SER-MCNC: 3.5 GM/DL (ref 2.4–3.5)
GLUCOSE SERPL-MCNC: 92 MG/DL (ref 82–115)
HDLC SERPL-MCNC: 67 MG/DL (ref 35–60)
LDLC SERPL CALC-MCNC: 146 MG/DL (ref 50–140)
POTASSIUM SERPL-SCNC: 3.9 MMOL/L (ref 3.5–5.1)
PROT SERPL-MCNC: 7.3 GM/DL (ref 5.8–7.6)
SODIUM SERPL-SCNC: 142 MMOL/L (ref 136–145)
T3FREE SERPL-MCNC: 1.99 PG/ML (ref 1.57–3.91)
T4 FREE SERPL-MCNC: 1.33 NG/DL (ref 0.7–1.48)
TRIGL SERPL-MCNC: 87 MG/DL (ref 37–140)
TSH SERPL-ACNC: 2.12 UIU/ML (ref 0.35–4.94)
VLDLC SERPL CALC-MCNC: 17 MG/DL

## 2023-03-02 PROCEDURE — 80053 COMPREHEN METABOLIC PANEL: CPT

## 2023-03-02 PROCEDURE — 84439 ASSAY OF FREE THYROXINE: CPT

## 2023-03-02 PROCEDURE — 84443 ASSAY THYROID STIM HORMONE: CPT

## 2023-03-02 PROCEDURE — 36415 COLL VENOUS BLD VENIPUNCTURE: CPT

## 2023-03-02 PROCEDURE — 80061 LIPID PANEL: CPT

## 2023-03-02 PROCEDURE — 84481 FREE ASSAY (FT-3): CPT

## 2023-09-11 ENCOUNTER — LAB VISIT (OUTPATIENT)
Dept: LAB | Facility: HOSPITAL | Age: 88
End: 2023-09-11
Attending: INTERNAL MEDICINE
Payer: MEDICARE

## 2023-09-11 DIAGNOSIS — K21.9 GASTROESOPHAGEAL REFLUX DISEASE, UNSPECIFIED WHETHER ESOPHAGITIS PRESENT: ICD-10-CM

## 2023-09-11 DIAGNOSIS — I51.9 MYXEDEMA HEART DISEASE: Primary | ICD-10-CM

## 2023-09-11 DIAGNOSIS — E78.2 MIXED HYPERLIPIDEMIA: ICD-10-CM

## 2023-09-11 DIAGNOSIS — E03.9 MYXEDEMA HEART DISEASE: Primary | ICD-10-CM

## 2023-09-11 DIAGNOSIS — I10 HYPERTENSION, UNSPECIFIED TYPE: ICD-10-CM

## 2023-09-11 LAB
ALBUMIN SERPL-MCNC: 3.7 G/DL (ref 3.4–4.8)
ALBUMIN/GLOB SERPL: 1.2 RATIO (ref 1.1–2)
ALP SERPL-CCNC: 36 UNIT/L (ref 40–150)
ALT SERPL-CCNC: 9 UNIT/L (ref 0–55)
AST SERPL-CCNC: 12 UNIT/L (ref 5–34)
BILIRUB SERPL-MCNC: 0.7 MG/DL
BUN SERPL-MCNC: 14 MG/DL (ref 9.8–20.1)
CALCIUM SERPL-MCNC: 9.9 MG/DL (ref 8.4–10.2)
CHLORIDE SERPL-SCNC: 104 MMOL/L (ref 98–107)
CHOLEST SERPL-MCNC: 215 MG/DL
CHOLEST/HDLC SERPL: 3 {RATIO} (ref 0–5)
CO2 SERPL-SCNC: 29 MMOL/L (ref 23–31)
CREAT SERPL-MCNC: 0.88 MG/DL (ref 0.55–1.02)
GFR SERPLBLD CREATININE-BSD FMLA CKD-EPI: >60 MLS/MIN/1.73/M2
GLOBULIN SER-MCNC: 3.2 GM/DL (ref 2.4–3.5)
GLUCOSE SERPL-MCNC: 88 MG/DL (ref 82–115)
HDLC SERPL-MCNC: 66 MG/DL (ref 35–60)
LDLC SERPL CALC-MCNC: 133 MG/DL (ref 50–140)
POTASSIUM SERPL-SCNC: 3.8 MMOL/L (ref 3.5–5.1)
PROT SERPL-MCNC: 6.9 GM/DL (ref 5.8–7.6)
SODIUM SERPL-SCNC: 141 MMOL/L (ref 136–145)
T4 FREE SERPL-MCNC: 1.26 NG/DL (ref 0.7–1.48)
TRIGL SERPL-MCNC: 79 MG/DL (ref 37–140)
TSH SERPL-ACNC: 3.82 UIU/ML (ref 0.35–4.94)
VLDLC SERPL CALC-MCNC: 16 MG/DL

## 2023-09-11 PROCEDURE — 84443 ASSAY THYROID STIM HORMONE: CPT

## 2023-09-11 PROCEDURE — 36415 COLL VENOUS BLD VENIPUNCTURE: CPT

## 2023-09-11 PROCEDURE — 80061 LIPID PANEL: CPT

## 2023-09-11 PROCEDURE — 84439 ASSAY OF FREE THYROXINE: CPT

## 2023-09-11 PROCEDURE — 80053 COMPREHEN METABOLIC PANEL: CPT

## 2023-09-12 ENCOUNTER — LAB VISIT (OUTPATIENT)
Dept: LAB | Facility: HOSPITAL | Age: 88
End: 2023-09-12
Attending: INTERNAL MEDICINE
Payer: MEDICARE

## 2023-09-12 DIAGNOSIS — I10 HYPERTENSION, UNSPECIFIED TYPE: ICD-10-CM

## 2023-09-12 DIAGNOSIS — E78.2 MIXED HYPERLIPIDEMIA: ICD-10-CM

## 2023-09-12 DIAGNOSIS — I51.9 MYXEDEMA HEART DISEASE: Primary | ICD-10-CM

## 2023-09-12 DIAGNOSIS — E03.9 MYXEDEMA HEART DISEASE: Primary | ICD-10-CM

## 2023-09-12 LAB
BASOPHILS # BLD AUTO: 0.05 X10(3)/MCL
BASOPHILS NFR BLD AUTO: 0.5 %
EOSINOPHIL # BLD AUTO: 0.19 X10(3)/MCL (ref 0–0.9)
EOSINOPHIL NFR BLD AUTO: 2 %
ERYTHROCYTE [DISTWIDTH] IN BLOOD BY AUTOMATED COUNT: 12.9 % (ref 11.5–17)
HCT VFR BLD AUTO: 41.3 % (ref 37–47)
HGB BLD-MCNC: 13.7 G/DL (ref 12–16)
IMM GRANULOCYTES # BLD AUTO: 0.06 X10(3)/MCL (ref 0–0.04)
IMM GRANULOCYTES NFR BLD AUTO: 0.6 %
LYMPHOCYTES # BLD AUTO: 1.65 X10(3)/MCL (ref 0.6–4.6)
LYMPHOCYTES NFR BLD AUTO: 17.8 %
MCH RBC QN AUTO: 30.6 PG (ref 27–31)
MCHC RBC AUTO-ENTMCNC: 33.2 G/DL (ref 33–36)
MCV RBC AUTO: 92.4 FL (ref 80–94)
MONOCYTES # BLD AUTO: 0.67 X10(3)/MCL (ref 0.1–1.3)
MONOCYTES NFR BLD AUTO: 7.2 %
NEUTROPHILS # BLD AUTO: 6.65 X10(3)/MCL (ref 2.1–9.2)
NEUTROPHILS NFR BLD AUTO: 71.9 %
NRBC BLD AUTO-RTO: 0 %
PLATELET # BLD AUTO: 336 X10(3)/MCL (ref 130–400)
PMV BLD AUTO: 9.2 FL (ref 7.4–10.4)
RBC # BLD AUTO: 4.47 X10(6)/MCL (ref 4.2–5.4)
WBC # SPEC AUTO: 9.27 X10(3)/MCL (ref 4.5–11.5)

## 2023-09-12 PROCEDURE — 85025 COMPLETE CBC W/AUTO DIFF WBC: CPT

## 2023-09-12 PROCEDURE — 36415 COLL VENOUS BLD VENIPUNCTURE: CPT

## 2024-02-26 DIAGNOSIS — M17.11 OSTEOARTHRITIS OF RIGHT KNEE: Primary | ICD-10-CM

## 2024-02-26 DIAGNOSIS — M17.12 DEGENERATIVE ARTHRITIS OF LEFT KNEE: ICD-10-CM

## 2024-02-27 ENCOUNTER — CLINICAL SUPPORT (OUTPATIENT)
Dept: REHABILITATION | Facility: HOSPITAL | Age: 89
End: 2024-02-27
Payer: MEDICARE

## 2024-02-27 DIAGNOSIS — Z74.09 IMPAIRED FUNCTIONAL MOBILITY, BALANCE, GAIT, AND ENDURANCE: Primary | ICD-10-CM

## 2024-02-27 DIAGNOSIS — M25.561 BILATERAL CHRONIC KNEE PAIN: ICD-10-CM

## 2024-02-27 DIAGNOSIS — G89.29 BILATERAL CHRONIC KNEE PAIN: ICD-10-CM

## 2024-02-27 DIAGNOSIS — M25.562 BILATERAL CHRONIC KNEE PAIN: ICD-10-CM

## 2024-02-27 PROCEDURE — 97161 PT EVAL LOW COMPLEX 20 MIN: CPT

## 2024-02-27 PROCEDURE — 97110 THERAPEUTIC EXERCISES: CPT

## 2024-02-27 NOTE — PROGRESS NOTES
[facility]  Physical Therapy Initial Evaluation    Name: Naomi Parra  Clinic Number: [id number]    Therapy Diagnosis:Bilateral knee pain, weakness, decreased functional mobility  Physician: Order, Paper    Physician Orders: PT Eval and Treat  Medical Diagnosis from Referral: Unilateral osteoarthritis right knee; Unilateral osteoarthritis left knee; blateral knee degernative joint disease, medial joint space collapse with bone on bone and varus positioning  Evaluation Date: 2/27/2024  Authorization Period Expiration: medically necessary  Plan of Care Expiration: 5/21/2024  Visit # / Visits authorized: medically necessary    Time In: 1055  Time Out: 1157  Total Appointment Time (timed & untimed codes): 62 minutes  Total Treatment time (time-based codes) separate from Evaluation: 32 minutes    Surgery: none  Orthopedic Precautions: none  Pertinent History: see medical chart    Subjective   Naomi reports: Naomi is an 88 yr old female with a stated  20 year history of bilateral knee pain with the right being worse than the left. The knee have gradually worsened. She has had multiple cortisone injections with varying results but does report that the most recent ones have offered some pretty good relief. She also reports that her symptoms have improved with rest using a wheelchair at times but also the use of a rolling walker has been beneficial. The use of anti inflammatory meds and topical gel (Diclofenac) has also helped. Currently she rates her pain 4/10 right knee and 3/10 left knee but both can reach 8/10 at its worse. The pain is medial aspect of both knees where imaging shows bone on bone.When acting up the knees can swell but her right knee tnds to be worse and she feels it is weaker than her left. Mornings tend to be worse as there is quite a bit of knee stiffness that she says improves after getting up and moving around but is a little difficult at times. Prolong sitting and then  having to get up from a chair is painful and difficult, she does best when moving around as long as she does not move around too much. She does walk a lot in her house but is not able to walk long distances at one time. She does have some numbness into the distal LE's that she attribute to varicose vein surgery. She states that she is scheduled on the 8th for Rooster Comb injections.    BALDO: osteoarthrits  Reason for visit: bilateral knee pain with limited functional mobility  Onset time and any changes: 20 years but much worse the last year  Pain level and location: 4/10 right and 3/10 left  Radiate proximal or distal: denies  Describe pain: aching, sharp, dull, stiffness, and throbbing  Numbness/tingling: yes but subjectively unrelated to her knee pain  Agg factors: prolong activity or positions  Ease factors: heat, ice, medication: antiinflammatory, the use of a walker, cortisone injection  Sleeping position: back with her knees straight  Worse when: mornings when trying to get up  Functional Limitations: patient is limited wth some ADL's, functional mobility  and functional capacity  Goals: minimize knee pain and allow for her to be able to do more  Prior therapy/intervention: no      Medical History:   No past medical history on file.    Surgical History:   Naomi Parra  has no past surgical history on file.    Medications:   Naomi currently has no medications in their medication list.    Allergies:   Review of patient's allergies indicates:  Not on File       Imaging, X-ray right knee:   varus collapse and joint space narrowing with stance; bone touching bone; advanced arthritis present; patellofemoral joint shows arthritic change; no sign of acute abnormalities evident with no sign of fracture present, subluxation, dislocation, erosive or invasive changes. There are subchondral cystic changes and periarticular osteophytes, there are subchondral sclerotic changes.    Imaging, X-ray left knee:   varus  collapse and joint space narrowing with stance; bone touching bone; advanced arthritis present; patellofemoral joint shows arthritic change; no sign of acute abnormalities evident with no sign of fracture present, subluxation, dislocation, erosive or invasive changes. There are subchondral cystic changes and periarticular osteophytes, there are subchondral sclerotic changes.      Outcome Measure:  Therapist reviewed FOTO scores for Naomi Parra on 02/27/2024.   FOTO documents entered into [employer] - see Media section.     Intake: Patient's Physical FS Primary Measure: 31  Intake: Risk Adjusted Statistical FOTO: 42  Intake: Limitation Score: %  Category: Mobility  Intake:  KOOS, JR: 42.3% functional    Objective       Posture/Appearance     Right - varus deformity mild swelling/puffiness, no redness or warmth, no lymphedema     Left - varus deformity mild swelling/puffiness, no redness or warmth, no lymphedema     Pain:  4/10 at right knee  3/10 t left knee   Palpation    Right-with mild palpatory pressure there is tenderness to the medial joint line, crepitus with ROM; tenderness along the IT band and at the greater trochanter; negative tenderness fibular head, quad tendon, calf,      Left - mild tender to mild palpatory pressure medial and anteromedial joint line but less than the right, negative tenderness fibular head, quad tendon, calf      Gait     Antalgic with limp using a 4 wheeled rolling walker with decreased weight bearing/heelstrike right LE with decreased stance on the right; better weight bearing and shifting onto the left      ROM     Knee  Right-  0-100 degrees     Left- 0-102 degrees      Strength     HIP ER - Right 4/5, Left 5/5  HIP IR - Right 4+/5, Left 5/5  HIP ABD - Right 4/5, Left 5/5  HIP ADD - Right 5/5, Left 5/5  KNEE FLX-Right 4/5, Left 4+/5  KNEE EXT- Right 4-/5, Left 4+/5      Dermatomes     BLEs intact to light touch      Special Tests     Right knee:  Patellar grind:  positive  PatelloFemoral Crepitus: positive   Patellar Mobility:  Reduced  Ant. Drawer: negative   Post. Drawer: negative   Lachman's: negative   Valgus Stress @ 0 deg: stable  Valgus Stress @ 30 deg: stable  Varus Stress @ 0 deg: stable  Varus Stress @ 30 deg: stable  Patellar Mobility:  Reduced     Left knee:  Patellar grind: positive  PatelloFemoral Crepitus: positive   Patellar Mobility:  Reduced  Ant. Drawer: negative   Post. Drawer: negative   Lachman's: negative   Valgus Stress @ 0 deg: stable  Valgus Stress @ 30 deg: stable  Varus Stress @ 0 deg: stable  Varus Stress @ 30 deg: stable  Patellar Mobility:  Reduced       Hip/Lumbar Exam     Lumbar ROM  Deferred     90/90 hamstring test: (neg) right, (neg) left  Cleve test: (neg) right; (neg) left  Hip IR ROM: mild limits without pain right; complete without pain left  Hip ER ROM: mild limits without pain right; complete without pain left       Functional Testing     5x STS = 38 sec w mild to moderate difficulty and UE use  2 MWT = 180' w rollator     Balance     Tandem R forward = NT  Tandem L forward = NT     Sit to Stand test:  Significantly favors the right>left knee     Squat test:  Able to partially squat but with pain and weakness both knees            TREATMENT     Naomi received the treatments listed below:       Time Activities   Manual     TherAct     TherEx 30 HEP; quad sets with glute sets, heel slides, hip abd, SAQ, SLR, bridge   Gait     Neuro Re-ed     Modalities     E-Stim     Dry Needling     Canalith Repositioning       Home Exercises and Patient Education Provided    Education provided:   -Plan of care, HEP, walking     Written Home Exercises Provided: yes.  Exercises were reviewed on handout given and Naomi was able to demonstrate them prior to the end of the session.  Naomi demonstrated good understanding of the education provided.      See EMR under Media for exercises provided  02/27/2023 .    Assessment   Naomi is a 88 y.o. female  referred to outpatient Physical Therapy with a medical diagnosis of Unilateral osteoarthritis right knee; Unilateral osteoarthritis left knee; blateral knee degernative joint disease, medial joint space collapse with bone on bone and varus positioning. Patient presents with pain both knees with mild swellling, decreased strength both knees, decreased ROM both knees, limited functional mobility requiring the use of a 4 wheeled rolling walker all resulting in decreased functional capacity. Patient will benefit from skilled outpatient Physical Therapy to address the deficits stated above and in the chart below, provide education, and to maximize patient's level of independence.    Patient prognosis is Good.     Plan of care discussed with patient: Yes  Patient's spiritual, cultural and educational needs considered and patient is agreeable to the plan of care and goals as stated below:    Anticipated Barriers for therapy: age related    Goals:  Short Term Goals: 6 weeks   Patient will report at least 10% increase in functional capacity from initial LEFS score to indicate clinically significant functional improvement  Patient will report at least 10% increase on initial FOTO score to indicate clinically significant functional improvement  Patient will report at least 20% reduction in pain.     Long Term Goals: 12 weeks   Patient will report at least 20% increase in functional capacity from initial LEFS score to indicate clinically significant functional improvement  Patient will report at least 20% increase on initial FOTO score to indicate clinically significant functional improvement  Patient will report at least 50% overall perceived improvement since start of therapy.  Patient will demonstrate equal ROM at B knees for improved functional mobility  Patient will demonstrate equal strength B LE's for improved ease with walking and functional balance     Plan   Plan of care Certification: 2/27/2024 to  5/21/2024.    Outpatient Physical Therapy 2-3 times weekly for 12 weeks to include the following interventions: Gait Training, Manual Therapy, Moist Heat/ Ice, Neuromuscular Re-ed, Patient Education, Self Care, Therapeutic Activities, and Therapeutic Exercise.      Stan Rose, PT

## 2024-02-28 NOTE — PLAN OF CARE
[facility]  Physical Therapy Initial Evaluation    Name: Naomi Parra  Clinic Number: [id number]    Therapy Diagnosis:Bilateral knee pain, weakness, decreased functional mobility  Physician: Order, Paper    Physician Orders: PT Eval and Treat  Medical Diagnosis from Referral: Unilateral osteoarthritis right knee; Unilateral osteoarthritis left knee; blateral knee degernative joint disease, medial joint space collapse with bone on bone and varus positioning  Evaluation Date: 2/27/2024  Authorization Period Expiration: medically necessary  Plan of Care Expiration: 5/21/2024  Visit # / Visits authorized: medically necessary    Time In: 1055  Time Out: 1157  Total Appointment Time (timed & untimed codes): 62 minutes  Total Treatment time (time-based codes) separate from Evaluation: 32 minutes    Surgery: none  Orthopedic Precautions: none  Pertinent History: see medical chart    Subjective   Naomi reports: Naomi is an 88 yr old female with a stated  20 year history of bilateral knee pain with the right being worse than the left. The knee have gradually worsened. She has had multiple cortisone injections with varying results but does report that the most recent ones have offered some pretty good relief. She also reports that her symptoms have improved with rest using a wheelchair at times but also the use of a rolling walker has been beneficial. The use of anti inflammatory meds and topical gel (Diclofenac) has also helped. Currently she rates her pain 4/10 right knee and 3/10 left knee but both can reach 8/10 at its worse. The pain is medial aspect of both knees where imaging shows bone on bone.When acting up the knees can swell but her right knee tnds to be worse and she feels it is weaker than her left. Mornings tend to be worse as there is quite a bit of knee stiffness that she says improves after getting up and moving around but is a little difficult at times. Prolong sitting and then  having to get up from a chair is painful and difficult, she does best when moving around as long as she does not move around too much. She does walk a lot in her house but is not able to walk long distances at one time. She does have some numbness into the distal LE's that she attribute to varicose vein surgery. She states that she is scheduled on the 8th for Rooster Comb injections.    BALDO: osteoarthrits  Reason for visit: bilateral knee pain with limited functional mobility  Onset time and any changes: 20 years but much worse the last year  Pain level and location: 4/10 right and 3/10 left  Radiate proximal or distal: denies  Describe pain: aching, sharp, dull, stiffness, and throbbing  Numbness/tingling: yes but subjectively unrelated to her knee pain  Agg factors: prolong activity or positions  Ease factors: heat, ice, medication: antiinflammatory, the use of a walker, cortisone injection  Sleeping position: back with her knees straight  Worse when: mornings when trying to get up  Functional Limitations: patient is limited wth some ADL's, functional mobility  and functional capacity  Goals: minimize knee pain and allow for her to be able to do more  Prior therapy/intervention: no      Medical History:   No past medical history on file.    Surgical History:   Naomi Parra  has no past surgical history on file.    Medications:   Naomi currently has no medications in their medication list.    Allergies:   Review of patient's allergies indicates:  Not on File       Imaging, X-ray right knee:   varus collapse and joint space narrowing with stance; bone touching bone; advanced arthritis present; patellofemoral joint shows arthritic change; no sign of acute abnormalities evident with no sign of fracture present, subluxation, dislocation, erosive or invasive changes. There are subchondral cystic changes and periarticular osteophytes, there are subchondral sclerotic changes.    Imaging, X-ray left knee:   varus  collapse and joint space narrowing with stance; bone touching bone; advanced arthritis present; patellofemoral joint shows arthritic change; no sign of acute abnormalities evident with no sign of fracture present, subluxation, dislocation, erosive or invasive changes. There are subchondral cystic changes and periarticular osteophytes, there are subchondral sclerotic changes.      Outcome Measure:  Therapist reviewed FOTO scores for Naomi Parra on 02/27/2024.   FOTO documents entered into [employer] - see Media section.     Intake: Patient's Physical FS Primary Measure: 31  Intake: Risk Adjusted Statistical FOTO: 42  Intake: Limitation Score: %  Category: Mobility  Intake:  KOOS, JR: 42.3% functional    Objective       Posture/Appearance     Right - varus deformity mild swelling/puffiness, no redness or warmth, no lymphedema     Left - varus deformity mild swelling/puffiness, no redness or warmth, no lymphedema     Pain:  4/10 at right knee  3/10 t left knee   Palpation    Right-with mild palpatory pressure there is tenderness to the medial joint line, crepitus with ROM; tenderness along the IT band and at the greater trochanter; negative tenderness fibular head, quad tendon, calf,      Left - mild tender to mild palpatory pressure medial and anteromedial joint line but less than the right, negative tenderness fibular head, quad tendon, calf      Gait     Antalgic with limp using a 4 wheeled rolling walker with decreased weight bearing/heelstrike right LE with decreased stance on the right; better weight bearing and shifting onto the left      ROM     Knee  Right-  0-100 degrees     Left- 0-102 degrees      Strength     HIP ER - Right 4/5, Left 5/5  HIP IR - Right 4+/5, Left 5/5  HIP ABD - Right 4/5, Left 5/5  HIP ADD - Right 5/5, Left 5/5  KNEE FLX-Right 4/5, Left 4+/5  KNEE EXT- Right 4-/5, Left 4+/5      Dermatomes     BLEs intact to light touch      Special Tests     Right knee:  Patellar grind:  positive  PatelloFemoral Crepitus: positive   Patellar Mobility:  Reduced  Ant. Drawer: negative   Post. Drawer: negative   Lachman's: negative   Valgus Stress @ 0 deg: stable  Valgus Stress @ 30 deg: stable  Varus Stress @ 0 deg: stable  Varus Stress @ 30 deg: stable  Patellar Mobility:  Reduced     Left knee:  Patellar grind: positive  PatelloFemoral Crepitus: positive   Patellar Mobility:  Reduced  Ant. Drawer: negative   Post. Drawer: negative   Lachman's: negative   Valgus Stress @ 0 deg: stable  Valgus Stress @ 30 deg: stable  Varus Stress @ 0 deg: stable  Varus Stress @ 30 deg: stable  Patellar Mobility:  Reduced       Hip/Lumbar Exam     Lumbar ROM  Deferred     90/90 hamstring test: (neg) right, (neg) left  Cleve test: (neg) right; (neg) left  Hip IR ROM: mild limits without pain right; complete without pain left  Hip ER ROM: mild limits without pain right; complete without pain left       Functional Testing     5x STS = 38 sec w mild to moderate difficulty and UE use  2 MWT = 180' w rollator     Balance     Tandem R forward = NT  Tandem L forward = NT     Sit to Stand test:  Significantly favors the right>left knee     Squat test:  Able to partially squat but with pain and weakness both knees            TREATMENT     Naomi received the treatments listed below:       Time Activities   Manual     TherAct     TherEx 30 HEP; quad sets with glute sets, heel slides, hip abd, SAQ, SLR, bridge   Gait     Neuro Re-ed     Modalities     E-Stim     Dry Needling     Canalith Repositioning       Home Exercises and Patient Education Provided    Education provided:   -Plan of care, HEP, walking     Written Home Exercises Provided: yes.  Exercises were reviewed on handout given and Naomi was able to demonstrate them prior to the end of the session.  Naomi demonstrated good understanding of the education provided.      See EMR under Media for exercises provided  02/27/2023 .    Assessment   Naomi is a 88 y.o. female  referred to outpatient Physical Therapy with a medical diagnosis of Unilateral osteoarthritis right knee; Unilateral osteoarthritis left knee; blateral knee degernative joint disease, medial joint space collapse with bone on bone and varus positioning. Patient presents with pain both knees with mild swellling, decreased strength both knees, decreased ROM both knees, limited functional mobility requiring the use of a 4 wheeled rolling walker all resulting in decreased functional capacity. Patient will benefit from skilled outpatient Physical Therapy to address the deficits stated above and in the chart below, provide education, and to maximize patient's level of independence.    Patient prognosis is Good.     Plan of care discussed with patient: Yes  Patient's spiritual, cultural and educational needs considered and patient is agreeable to the plan of care and goals as stated below:    Anticipated Barriers for therapy: age related    Goals:  Short Term Goals: 6 weeks   Patient will report at least 10% increase in functional capacity from initial LEFS score to indicate clinically significant functional improvement  Patient will report at least 10% increase on initial FOTO score to indicate clinically significant functional improvement  Patient will report at least 20% reduction in pain.     Long Term Goals: 12 weeks   Patient will report at least 20% increase in functional capacity from initial LEFS score to indicate clinically significant functional improvement  Patient will report at least 20% increase on initial FOTO score to indicate clinically significant functional improvement  Patient will report at least 50% overall perceived improvement since start of therapy.  Patient will demonstrate equal ROM at B knees for improved functional mobility  Patient will demonstrate equal strength B LE's for improved ease with walking and functional balance     Plan   Plan of care Certification: 2/27/2024 to  5/21/2024.    Outpatient Physical Therapy 2-3 times weekly for 12 weeks to include the following interventions: Gait Training, Manual Therapy, Moist Heat/ Ice, Neuromuscular Re-ed, Patient Education, Self Care, Therapeutic Activities, and Therapeutic Exercise.        Stan Rose, PT

## 2024-03-05 ENCOUNTER — CLINICAL SUPPORT (OUTPATIENT)
Dept: REHABILITATION | Facility: HOSPITAL | Age: 89
End: 2024-03-05
Payer: MEDICARE

## 2024-03-05 DIAGNOSIS — Z74.09 IMPAIRED FUNCTIONAL MOBILITY, BALANCE, GAIT, AND ENDURANCE: ICD-10-CM

## 2024-03-05 DIAGNOSIS — G89.29 BILATERAL CHRONIC KNEE PAIN: Primary | ICD-10-CM

## 2024-03-05 DIAGNOSIS — M25.561 BILATERAL CHRONIC KNEE PAIN: Primary | ICD-10-CM

## 2024-03-05 DIAGNOSIS — M25.562 BILATERAL CHRONIC KNEE PAIN: Primary | ICD-10-CM

## 2024-03-05 PROCEDURE — 97110 THERAPEUTIC EXERCISES: CPT

## 2024-03-05 NOTE — PROGRESS NOTES
"  Physical Therapy Treatment Note     Name: Naomi Parra  Clinic Number: [id number]    Therapy Diagnosis:   Encounter Diagnoses   Name Primary?    Bilateral chronic knee pain Yes    Impaired functional mobility, balance, gait, and endurance      Physician: Order, Paper    Visit Date: 3/5/2024    Physician Orders: PT Eval and Treat  Medical Diagnosis from Referral: Unilateral osteoarthritis right knee; Unilateral osteoarthritis left knee; blateral knee degernative joint disease, medial joint space collapse with bone on bone and varus positioning  Evaluation Date: 2/27/2024  Authorization Period Expiration: medically necessary  Plan of Care Expiration: 5/21/2024  Visit # / Visits authorized: 1/ medically necessary    Time In: 1041  Time Out: 1140  Total Billable Time: 59 minutes    Surgery: none  Orthopedic Precautions: none  Pertinent History: see medical chart  Subjective     Patient reports: she is continuing with slightly more pain in L knee for past 2 weeks following hitting it on something at home. Typically R knee hurts more, however today she is also complaining of R thigh "deep ache which is nauseating". Pt became tearful and has some difficulty describing pain in thigh but referred to it as "possibly sciatica". Unable to fully quantify pain though stated a 6/10 in L knee.     CMS Impairment/Limitation/Restriction for FOTO Survey  Therapist reviewed FOTO scores for Naomi Parra on 02/27/2024.   FOTO documents entered into EPIC - see Media section.     Intake: Patient's Physical FS Primary Measure: 31  Intake: Risk Adjusted Statistical FOTO: 42  Intake: Limitation Score: %  Category: Mobility  Intake:  HUMAIRA JR: 42.3% functional    Objective     Naomi received the following treatment:     Time Activities   Manual     TherAct      TherEx 59 min Nustep, quad sets, heel slides, SAQ, SLR, hip abd, hip add squeezes, bridges, clams, Sit to stands, standing marches, standing heel raises, marching, sit to stands.  "   Gait     Neuro Re-ed     Modalities     E-Stim     Dry Needling     Canalith Repositioning         Home Exercises Provided and Patient Education Provided     Education provided:   -Plan of care, HEP, review with patient and she verbalizes completing them routinely at home.     Assessment   Naomi is a 88 y.o. female referred to outpatient Physical Therapy with a medical diagnosis of Unilateral osteoarthritis right knee; Unilateral osteoarthritis left knee; blateral knee degernative joint disease, medial joint space collapse with bone on bone and varus positioning. Patient presents with pain both knees with mild swellling, decreased strength both knees, decreased ROM both knees, limited functional mobility requiring the use of a 4 wheeled rolling walker all resulting in decreased functional capacity. Patient will benefit from skilled outpatient Physical Therapy to address the deficits stated above and in the chart below, provide education, and to maximize patient's level of independence.     Had some challenges with R LE there ex intermittently and verbalized when she was not able to complete task. Offered assist for several exercises and also reduced expected reps for R LE, primarily due to hip/thigh pain more than knee pain. Pt tearful at times throughout there ex and therapist provided moderate amounts of verbal cues and reminders on exercises. Able to complete program today with modifications and rest breaks. Will need to continue monitoring R hip/thigh pain and progress activity as tolerated.     Patient prognosis is Fair.      Anticipated barriers to physical therapy: age and long term nature of chronic B knee pain.     Goals: Naomi Is progressing well towards her goals.    Short Term Goals: 6 weeks   Patient will report at least 10% increase in functional capacity from initial LEFS score to indicate clinically significant functional improvement  Patient will report at least 10% increase on initial FOTO score  to indicate clinically significant functional improvement  Patient will report at least 20% reduction in pain.     Long Term Goals: 12 weeks   Patient will report at least 20% increase in functional capacity from initial LEFS score to indicate clinically significant functional improvement  Patient will report at least 20% increase on initial FOTO score to indicate clinically significant functional improvement  Patient will report at least 50% overall perceived improvement since start of therapy.  Patient will demonstrate equal ROM at B knees for improved functional mobility  Patient will demonstrate equal strength B LE's for improved ease with walking and functional balance     Plan     Outpatient Physical Therapy 2-3 times weekly for 12 weeks to include the following interventions: Gait Training, Manual Therapy, Moist Heat/ Ice, Neuromuscular Re-ed, Patient Education, Self Care, Therapeutic Activities, and Therapeutic Exercise.       Oren Chaney, PT

## 2024-03-26 ENCOUNTER — CLINICAL SUPPORT (OUTPATIENT)
Dept: REHABILITATION | Facility: HOSPITAL | Age: 89
End: 2024-03-26
Payer: MEDICARE

## 2024-03-26 DIAGNOSIS — M25.561 BILATERAL CHRONIC KNEE PAIN: Primary | ICD-10-CM

## 2024-03-26 DIAGNOSIS — G89.29 BILATERAL CHRONIC KNEE PAIN: Primary | ICD-10-CM

## 2024-03-26 DIAGNOSIS — M25.562 BILATERAL CHRONIC KNEE PAIN: Primary | ICD-10-CM

## 2024-03-26 DIAGNOSIS — Z74.09 IMPAIRED FUNCTIONAL MOBILITY, BALANCE, GAIT, AND ENDURANCE: ICD-10-CM

## 2024-03-26 PROCEDURE — 97140 MANUAL THERAPY 1/> REGIONS: CPT

## 2024-03-26 PROCEDURE — 97110 THERAPEUTIC EXERCISES: CPT

## 2024-03-26 NOTE — PROGRESS NOTES
"  Physical Therapy Treatment Note     Name: Naomi Parra  Clinic Number: [id number]    Therapy Diagnosis:   No diagnosis found.    Physician: Order, Paper    Visit Date: 3/26/2024    Physician Orders: PT Eval and Treat  Medical Diagnosis from Referral: Unilateral osteoarthritis right knee; Unilateral osteoarthritis left knee; blateral knee degernative joint disease, medial joint space collapse with bone on bone and varus positioning  Evaluation Date: 2/27/2024  Authorization Period Expiration: medically necessary  Plan of Care Expiration: 5/21/2024  Visit # / Visits authorized: 2/ medically necessary    Time In: 1048  Time Out: 1146  Total Billable Time: 58 minutes    Surgery: none  Orthopedic Precautions: none  Pertinent History: see medical chart  Subjective     Patient reports: Naomi reports that she had "Rooster comb" injections into both knees and she is doing pretty well. No real pain but is having right "sciatica. Agrees to PT session.    CMS Impairment/Limitation/Restriction for FOTO Survey  Therapist reviewed FOTO scores for Naomi Parra on 02/27/2024.   FOTO documents entered into EPIC - see Media section.     Intake: Patient's Physical FS Primary Measure: 31  Intake: Risk Adjusted Statistical FOTO: 42  Intake: Limitation Score: %  Category: Mobility  Intake:  KOOS, JR: 42.3% functional    Objective     Naomi received the following treatment:     Time Activities   Manual 10 DTM to right superior gluteal, SI joint and piriformis; piriformis stretch    TherAct      TherEx 48 min Nustep, quad sets, heel slides, SAQ, SLR, hip abd, hip add squeezes, bridges, clams, Sit to stands, standing marches, standing heel raises, marching, 3 min walk    Gait     Neuro Re-ed     Modalities     E-Stim     Dry Needling     Canalith Repositioning         Home Exercises Provided and Patient Education Provided     Education provided:   -Plan of care, HEP, review with patient and she verbalizes completing them routinely at " "home.     Assessment   Naomi is a 88 y.o. female referred to outpatient Physical Therapy with a medical diagnosis of Unilateral osteoarthritis right knee; Unilateral osteoarthritis left knee; blateral knee degernative joint disease, medial joint space collapse with bone on bone and varus positioning. Patient presents with pain both knees with mild swellling, decreased strength both knees, decreased ROM both knees, limited functional mobility requiring the use of a 4 wheeled rolling walker all resulting in decreased functional capacity. Patient will benefit from skilled outpatient Physical Therapy to address the deficits stated above and in the chart below, provide education, and to maximize patient's level of independence.     Had some challenges with R LE due to her "sciatica"  with patient verbalized when she was not able to complete task. Gave multiple rest breaks and tired again but we kept all exercises at 10 reps on those that she could. Added a 3 min walk and she did pretty well. Did some manual work on the SI, piriformis area and she felt better afterwards. Will progress as tolerated.    Patient prognosis is Fair.      Anticipated barriers to physical therapy: age and long term nature of chronic B knee pain.     Goals: Naomi Is progressing well towards her goals.    Short Term Goals: 6 weeks   Patient will report at least 10% increase in functional capacity from initial LEFS score to indicate clinically significant functional improvement  Patient will report at least 10% increase on initial FOTO score to indicate clinically significant functional improvement  Patient will report at least 20% reduction in pain.     Long Term Goals: 12 weeks   Patient will report at least 20% increase in functional capacity from initial LEFS score to indicate clinically significant functional improvement  Patient will report at least 20% increase on initial FOTO score to indicate clinically significant functional " improvement  Patient will report at least 50% overall perceived improvement since start of therapy.  Patient will demonstrate equal ROM at B knees for improved functional mobility  Patient will demonstrate equal strength B LE's for improved ease with walking and functional balance     Plan     Outpatient Physical Therapy 2-3 times weekly for 12 weeks to include the following interventions: Gait Training, Manual Therapy, Moist Heat/ Ice, Neuromuscular Re-ed, Patient Education, Self Care, Therapeutic Activities, and Therapeutic Exercise.       Stan Rose, PT

## 2024-03-28 ENCOUNTER — CLINICAL SUPPORT (OUTPATIENT)
Dept: REHABILITATION | Facility: HOSPITAL | Age: 89
End: 2024-03-28
Payer: MEDICARE

## 2024-03-28 DIAGNOSIS — G89.29 BILATERAL CHRONIC KNEE PAIN: Primary | ICD-10-CM

## 2024-03-28 DIAGNOSIS — Z74.09 IMPAIRED FUNCTIONAL MOBILITY, BALANCE, GAIT, AND ENDURANCE: ICD-10-CM

## 2024-03-28 DIAGNOSIS — M25.562 BILATERAL CHRONIC KNEE PAIN: Primary | ICD-10-CM

## 2024-03-28 DIAGNOSIS — M25.561 BILATERAL CHRONIC KNEE PAIN: Primary | ICD-10-CM

## 2024-03-28 PROCEDURE — 97140 MANUAL THERAPY 1/> REGIONS: CPT

## 2024-03-28 PROCEDURE — 97110 THERAPEUTIC EXERCISES: CPT

## 2024-03-28 NOTE — PROGRESS NOTES
Physical Therapy Treatment Note     Name: Naomi Parra  Clinic Number: [id number]    Therapy Diagnosis:   No diagnosis found.    Physician: Order, Paper    Visit Date: 3/28/2024    Physician Orders: PT Eval and Treat  Medical Diagnosis from Referral: Unilateral osteoarthritis right knee; Unilateral osteoarthritis left knee; blateral knee degernative joint disease, medial joint space collapse with bone on bone and varus positioning  Evaluation Date: 2/27/2024  Authorization Period Expiration: medically necessary  Plan of Care Expiration: 5/21/2024  Visit # / Visits authorized: 4/ medically necessary    Time In: 1049  Time Out: 1129  Total Billable Time: 40 minutes    Surgery: none  Orthopedic Precautions: none  Pertinent History: see medical chart    Subjective: Naomi having some right lateral leg pain today. Left knee hurting more than the right today.     CMS Impairment/Limitation/Restriction for FOTO Survey  Therapist reviewed FOTO scores for Naomi Parra on 02/27/2024.   FOTO documents entered into EPIC - see Media section.     Intake: Patient's Physical FS Primary Measure: 31  Intake: Risk Adjusted Statistical FOTO: 42  Intake: Limitation Score: %  Category: Mobility  Intake:  HUMAIRA JR: 42.3% functional    Objective     Naomi received the following treatment:     Time Activities   Manual 15 DTM and trigger point release to the right IT band; passive stretch to the lateral hip, passive knee to chest, passive LTR   TherAct      TherEx 25 Nustep, quad sets, heel slides, SAQ, SLR, hip abd, hip add squeezes, bridges, clams, standing marches, standing heel raises,   Gait     Neuro Re-ed     Modalities     E-Stim     Dry Needling     Canalith Repositioning         Home Exercises Provided and Patient Education Provided     Education provided:   -Plan of care, HEP, review with patient and she verbalizes completing them routinely at home.     Assessment   Naomi is a 88 y.o. female referred to outpatient Physical  Therapy with a medical diagnosis of Unilateral osteoarthritis right knee; Unilateral osteoarthritis left knee; blateral knee degernative joint disease, medial joint space collapse with bone on bone and varus positioning. Patient presents with pain both knees with mild swellling, decreased strength both knees, decreased ROM both knees, limited functional mobility requiring the use of a 4 wheeled rolling walker all resulting in decreased functional capacity. Patient will benefit from skilled outpatient Physical Therapy to address the deficits stated above and in the chart below, provide education, and to maximize patient's level of independence.     Naomi struggled with the exercises today because of IT band tenderness. Did some manual work and this was helpful. Able to complete program today with modifications and rest breaks. Will need to continue monitoring R hip/thigh pain and progress activity as tolerated.     Patient prognosis is Fair.      Anticipated barriers to physical therapy: age and long term nature of chronic B knee pain.     Goals: Naomi Is progressing well towards her goals.    Short Term Goals: 6 weeks   Patient will report at least 10% increase in functional capacity from initial LEFS score to indicate clinically significant functional improvement  Patient will report at least 10% increase on initial FOTO score to indicate clinically significant functional improvement  Patient will report at least 20% reduction in pain.     Long Term Goals: 12 weeks   Patient will report at least 20% increase in functional capacity from initial LEFS score to indicate clinically significant functional improvement  Patient will report at least 20% increase on initial FOTO score to indicate clinically significant functional improvement  Patient will report at least 50% overall perceived improvement since start of therapy.  Patient will demonstrate equal ROM at B knees for improved functional mobility  Patient will  demonstrate equal strength B LE's for improved ease with walking and functional balance     Plan     Outpatient Physical Therapy 2-3 times weekly for 12 weeks to include the following interventions: Gait Training, Manual Therapy, Moist Heat/ Ice, Neuromuscular Re-ed, Patient Education, Self Care, Therapeutic Activities, and Therapeutic Exercise.       Stan Rose, PT

## 2024-04-02 ENCOUNTER — CLINICAL SUPPORT (OUTPATIENT)
Dept: REHABILITATION | Facility: HOSPITAL | Age: 89
End: 2024-04-02
Payer: MEDICARE

## 2024-04-02 DIAGNOSIS — G89.29 BILATERAL CHRONIC KNEE PAIN: Primary | ICD-10-CM

## 2024-04-02 DIAGNOSIS — M25.561 BILATERAL CHRONIC KNEE PAIN: Primary | ICD-10-CM

## 2024-04-02 DIAGNOSIS — M25.562 BILATERAL CHRONIC KNEE PAIN: Primary | ICD-10-CM

## 2024-04-02 DIAGNOSIS — Z74.09 IMPAIRED FUNCTIONAL MOBILITY, BALANCE, GAIT, AND ENDURANCE: ICD-10-CM

## 2024-04-02 PROCEDURE — 97140 MANUAL THERAPY 1/> REGIONS: CPT

## 2024-04-02 PROCEDURE — 97110 THERAPEUTIC EXERCISES: CPT

## 2024-04-03 NOTE — PROGRESS NOTES
"  Physical Therapy Treatment Note     Name: Naomi Parra  Clinic Number: [id number]    Therapy Diagnosis:   No diagnosis found.    Physician: Order, Paper    Visit Date: 4/2/2024    Physician Orders: PT Eval and Treat  Medical Diagnosis from Referral: Unilateral osteoarthritis right knee; Unilateral osteoarthritis left knee; blateral knee degernative joint disease, medial joint space collapse with bone on bone and varus positioning  Evaluation Date: 2/27/2024  Authorization Period Expiration: medically necessary  Plan of Care Expiration: 5/21/2024  Visit # / Visits authorized: 4/ medically necessary    Time In: 1046  Time Out: 1130  Total Billable Time: 44 minutes    Surgery: none  Orthopedic Precautions: none  Pertinent History: see medical chart    Subjective: Naomi reports that lateral leg and hip feels much better today. "What you did last time really helped." Left knee hurting more than the right today.     CMS Impairment/Limitation/Restriction for FOTO Survey  Therapist reviewed FOTO scores for Naomi aPrra on 02/27/2024.   FOTO documents entered into EPIC - see Media section.     Intake: Patient's Physical FS Primary Measure: 31  Intake: Risk Adjusted Statistical FOTO: 42  Intake: Limitation Score: %  Category: Mobility  Intake:  KOOS, JR: 42.3% functional    Objective     Naomi received the following treatment:     Time Activities   Manual 15 DTM and trigger point release to the right IT band; passive stretch to the lateral hip, passive knee to chest, passive LTR   TherAct      TherEx 29 Nustep, quad sets, heel slides, SAQ, SLR, hip abd, hip add squeezes, bridges, clams, standing marches, standing heel raises,   Gait     Neuro Re-ed     Modalities     E-Stim     Dry Needling     Canalith Repositioning         Home Exercises Provided and Patient Education Provided     Education provided:   -Plan of care, HEP, review with patient and she verbalizes completing them routinely at home.     Assessment   Naomi " is a 88 y.o. female referred to outpatient Physical Therapy with a medical diagnosis of Unilateral osteoarthritis right knee; Unilateral osteoarthritis left knee; blateral knee degernative joint disease, medial joint space collapse with bone on bone and varus positioning. Patient presents with pain both knees with mild swellling, decreased strength both knees, decreased ROM both knees, limited functional mobility requiring the use of a 4 wheeled rolling walker all resulting in decreased functional capacity. Patient will benefit from skilled outpatient Physical Therapy to address the deficits stated above and in the chart below, provide education, and to maximize patient's level of independence.     Naomi did a better job with the exercises today as there was less IT band tenderness. Did some manual work of DTM and trigger point release and this was helpful. Able to complete program today with modifications and rest breaks. Will need to continue monitoring R hip/thigh pain and progress activity as tolerated.     Patient prognosis is Fair.      Anticipated barriers to physical therapy: age and long term nature of chronic B knee pain.     Goals: Naomi Is progressing well towards her goals.    Short Term Goals: 6 weeks   Patient will report at least 10% increase in functional capacity from initial LEFS score to indicate clinically significant functional improvement  Patient will report at least 10% increase on initial FOTO score to indicate clinically significant functional improvement  Patient will report at least 20% reduction in pain.     Long Term Goals: 12 weeks   Patient will report at least 20% increase in functional capacity from initial LEFS score to indicate clinically significant functional improvement  Patient will report at least 20% increase on initial FOTO score to indicate clinically significant functional improvement  Patient will report at least 50% overall perceived improvement since start of  therapy.  Patient will demonstrate equal ROM at B knees for improved functional mobility  Patient will demonstrate equal strength B LE's for improved ease with walking and functional balance     Plan     Outpatient Physical Therapy 2-3 times weekly for 12 weeks to include the following interventions: Gait Training, Manual Therapy, Moist Heat/ Ice, Neuromuscular Re-ed, Patient Education, Self Care, Therapeutic Activities, and Therapeutic Exercise.       Stan Rose, PT

## 2024-04-04 ENCOUNTER — CLINICAL SUPPORT (OUTPATIENT)
Dept: REHABILITATION | Facility: HOSPITAL | Age: 89
End: 2024-04-04
Payer: MEDICARE

## 2024-04-04 DIAGNOSIS — M25.562 BILATERAL CHRONIC KNEE PAIN: Primary | ICD-10-CM

## 2024-04-04 DIAGNOSIS — M25.561 BILATERAL CHRONIC KNEE PAIN: Primary | ICD-10-CM

## 2024-04-04 DIAGNOSIS — G89.29 BILATERAL CHRONIC KNEE PAIN: Primary | ICD-10-CM

## 2024-04-04 DIAGNOSIS — Z74.09 IMPAIRED FUNCTIONAL MOBILITY, BALANCE, GAIT, AND ENDURANCE: ICD-10-CM

## 2024-04-04 DIAGNOSIS — M17.11 PRIMARY OSTEOARTHRITIS OF RIGHT KNEE: ICD-10-CM

## 2024-04-04 PROCEDURE — 97110 THERAPEUTIC EXERCISES: CPT

## 2024-04-04 PROCEDURE — 97140 MANUAL THERAPY 1/> REGIONS: CPT

## 2024-04-04 NOTE — PROGRESS NOTES
Physical Therapy Treatment Note     Name: Naomi Parra  Clinic Number: [id number]    Therapy Diagnosis:   No diagnosis found.    Physician: Order, Paper    Visit Date: 4/4/2024    Physician Orders: PT Eval and Treat  Medical Diagnosis from Referral: Unilateral osteoarthritis right knee; Unilateral osteoarthritis left knee; blateral knee degernative joint disease, medial joint space collapse with bone on bone and varus positioning  Evaluation Date: 2/27/2024  Authorization Period Expiration: medically necessary  Plan of Care Expiration: 5/21/2024  Visit # / Visits authorized: #6    Time In: 1052  Time Out: 1149  Total Billable Time:  47 minutes    Surgery: none  Orthopedic Precautions: none  Pertinent History: see medical chart    Subjective: Naomi reports that lateral leg and hip feels much better but is still there. She feels that when the hip area is hurting it is worse than her knees. Agrees to PT session.    CMS Impairment/Limitation/Restriction for FOTO Survey  Therapist reviewed FOTO scores for Naomi Parra on 02/27/2024.   FOTO documents entered into EPIC - see Media section.     Intake: Patient's Physical FS Primary Measure: 31  Intake: Risk Adjusted Statistical FOTO: 42  Intake: Limitation Score: %  Category: Mobility  Intake:  KOOS, JR: 42.3% functional    4/4/2024: Patient's Physical FS Primary Measure: 31  Intake: Risk Adjusted Statistical FOTO: 42  4/4/2024: Limitation Score: %  4/4/2024: Category: Mobility  4/4/2024: KOOS, JR: 42.3% functional    Objective     Naomi received the following treatment:     Time Activities   Manual 17 DTM and trigger point release to the right IT band; passive stretch to the lateral hip, passive knee to chest, passive LTR, Biofreeze   TherAct      TherEx 30 Nustep, quad sets, heel slides, SAQ, SLR, hip abd, hip add squeezes, bridges, clams, standing marches, standing heel raises,   Gait     Neuro Re-ed     Modalities 10 Moist heat to both knees prior to exercise.    E-Stim     Dry Needling     Canalith Repositioning         Home Exercises Provided and Patient Education Provided     Education provided:   -Plan of care, HEP, review with patient and she verbalizes completing them routinely at home.     Assessment   Naomi is a 88 y.o. female referred to outpatient Physical Therapy with a medical diagnosis of Unilateral osteoarthritis right knee; Unilateral osteoarthritis left knee; blateral knee degernative joint disease, medial joint space collapse with bone on bone and varus positioning. Patient presents with pain both knees with mild swellling, decreased strength both knees, decreased ROM both knees, limited functional mobility requiring the use of a 4 wheeled rolling walker all resulting in decreased functional capacity. Patient will benefit from skilled outpatient Physical Therapy to address the deficits stated above and in the chart below, provide education, and to maximize patient's level of independence.     Naomi tends to be up and down with her pain and it appears the lateral hip gives more trouble than the knee right know. She does get good relief with therapy but it does not last. did a better job with the exercises today as there was less IT band tenderness. Did some manual work of DTM and trigger point release and this was helpful. Able to complete program today with modifications and rest breaks. Will need to continue monitoring R hip/thigh pain and progress activity as tolerated.     Patient prognosis is Fair.      Anticipated barriers to physical therapy: age and long term nature of chronic B knee pain.     Goals: Naomi Is progressing well towards her goals.    Short Term Goals: 6 weeks   Patient will report at least 10% increase in functional capacity from initial LEFS score to indicate clinically significant functional improvement  Patient will report at least 10% increase on initial FOTO score to indicate clinically significant functional improvement  Patient  will report at least 20% reduction in pain.     Long Term Goals: 12 weeks   Patient will report at least 20% increase in functional capacity from initial LEFS score to indicate clinically significant functional improvement  Patient will report at least 20% increase on initial FOTO score to indicate clinically significant functional improvement  Patient will report at least 50% overall perceived improvement since start of therapy.  Patient will demonstrate equal ROM at B knees for improved functional mobility  Patient will demonstrate equal strength B LE's for improved ease with walking and functional balance     Plan     Outpatient Physical Therapy 2-3 times weekly for 12 weeks to include the following interventions: Gait Training, Manual Therapy, Moist Heat/ Ice, Neuromuscular Re-ed, Patient Education, Self Care, Therapeutic Activities, and Therapeutic Exercise.       Stan Rose, PT

## 2024-04-09 ENCOUNTER — CLINICAL SUPPORT (OUTPATIENT)
Dept: REHABILITATION | Facility: HOSPITAL | Age: 89
End: 2024-04-09
Payer: MEDICARE

## 2024-04-09 DIAGNOSIS — M25.562 BILATERAL CHRONIC KNEE PAIN: Primary | ICD-10-CM

## 2024-04-09 DIAGNOSIS — G89.29 BILATERAL CHRONIC KNEE PAIN: Primary | ICD-10-CM

## 2024-04-09 DIAGNOSIS — M17.11 PRIMARY OSTEOARTHRITIS OF RIGHT KNEE: ICD-10-CM

## 2024-04-09 DIAGNOSIS — M25.561 BILATERAL CHRONIC KNEE PAIN: Primary | ICD-10-CM

## 2024-04-09 DIAGNOSIS — Z74.09 IMPAIRED FUNCTIONAL MOBILITY, BALANCE, GAIT, AND ENDURANCE: ICD-10-CM

## 2024-04-09 PROCEDURE — 97140 MANUAL THERAPY 1/> REGIONS: CPT

## 2024-04-09 PROCEDURE — 97110 THERAPEUTIC EXERCISES: CPT

## 2024-04-09 NOTE — PROGRESS NOTES
Physical Therapy Treatment Note     Name: Naomi Parra  Clinic Number: [id number]    Therapy Diagnosis:   Bilateral knee pain, weakness, gait disturance    Physician: Order, Paper    Visit Date: 4/9/2024    Physician Orders: PT Eval and Treat  Medical Diagnosis from Referral: Unilateral osteoarthritis right knee; Unilateral osteoarthritis left knee; blateral knee degernative joint disease, medial joint space collapse with bone on bone and varus positioning  Evaluation Date: 2/27/2024  Authorization Period Expiration: medically necessary  Plan of Care Expiration: 5/21/2024  Visit # / Visits authorized: #7    Time In: 1048  Time Out: 1148  Total Billable Time:  50 minutes    Surgery: none  Orthopedic Precautions: none  Pertinent History: see medical chart    Subjective: Naomi reports that lateral leg and hip feels better but knees acting up with the change in weather. Agrees to PT session.    CMS Impairment/Limitation/Restriction for FOTO Survey  Therapist reviewed FOTO scores for Naomi Parra on 02/27/2024.   FOTO documents entered into [employer] - see Media section.     Intake: Patient's Physical FS Primary Measure: 31  Intake: Risk Adjusted Statistical FOTO: 42  Intake: Limitation Score: %  Category: Mobility  Intake:  KOOS, JR: 42.3% functional    4/4/2024: Patient's Physical FS Primary Measure: 31  Intake: Risk Adjusted Statistical FOTO: 42  4/4/2024: Limitation Score: %  4/4/2024: Category: Mobility  4/4/2024: KOGINNY, JR: 42.3% functional    Objective     Naomi received the following treatment:     Time Activities   Manual 15 DTM and trigger point release to the right IT band; passive stretch to the lateral hip, passive knee to chest, passive LTR, Biofreeze   TherAct      TherEx 35 Nustep, quad sets, heel slides, SAQ, SLR, hip abd, hip add squeezes, bridges, clamshell, standing marches, standing heel raises,   Gait     Neuro Re-ed     Modalities 10 Moist heat to both knees prior to exercise.   E-Stim     Dry  Needling     Canalith Repositioning         Home Exercises Provided and Patient Education Provided     Education provided:   -Plan of care, HEP, review with patient and she verbalizes completing them routinely at home.     Assessment   Naomi is a 88 y.o. female referred to outpatient Physical Therapy with a medical diagnosis of Unilateral osteoarthritis right knee; Unilateral osteoarthritis left knee; blateral knee degernative joint disease, medial joint space collapse with bone on bone and varus positioning. Patient presents with pain both knees with mild swellling, decreased strength both knees, decreased ROM both knees, limited functional mobility requiring the use of a 4 wheeled rolling walker all resulting in decreased functional capacity. Patient will benefit from skilled outpatient Physical Therapy to address the deficits stated above and in the chart below, provide education, and to maximize patient's level of independence.     Naomi with less lateral leg and hip pain today but the knees giving more trouble. She does get good relief with therapy but it does not last. Overall did a better job with the exercises today as there was less IT band tenderness. Did some manual work of DTM and trigger point release and this was helpful. Able to complete program today with modifications and rest breaks. Will need to continue monitoring R hip/thigh pain and progress activity as tolerated.     Patient prognosis is Fair.      Anticipated barriers to physical therapy: age and long term nature of chronic B knee pain.     Goals: Naomi Is progressing well towards her goals.    Short Term Goals: 6 weeks   Patient will report at least 10% increase in functional capacity from initial LEFS score to indicate clinically significant functional improvement  Patient will report at least 10% increase on initial FOTO score to indicate clinically significant functional improvement  Patient will report at least 20% reduction in pain.      Long Term Goals: 12 weeks   Patient will report at least 20% increase in functional capacity from initial LEFS score to indicate clinically significant functional improvement  Patient will report at least 20% increase on initial FOTO score to indicate clinically significant functional improvement  Patient will report at least 50% overall perceived improvement since start of therapy.  Patient will demonstrate equal ROM at B knees for improved functional mobility  Patient will demonstrate equal strength B LE's for improved ease with walking and functional balance     Plan     Outpatient Physical Therapy 2-3 times weekly for 12 weeks to include the following interventions: Gait Training, Manual Therapy, Moist Heat/ Ice, Neuromuscular Re-ed, Patient Education, Self Care, Therapeutic Activities, and Therapeutic Exercise.       Stan oRse, PT

## 2024-04-11 ENCOUNTER — CLINICAL SUPPORT (OUTPATIENT)
Dept: REHABILITATION | Facility: HOSPITAL | Age: 89
End: 2024-04-11
Payer: MEDICARE

## 2024-04-11 DIAGNOSIS — Z74.09 IMPAIRED FUNCTIONAL MOBILITY, BALANCE, GAIT, AND ENDURANCE: ICD-10-CM

## 2024-04-11 DIAGNOSIS — M25.562 BILATERAL CHRONIC KNEE PAIN: Primary | ICD-10-CM

## 2024-04-11 DIAGNOSIS — G89.29 BILATERAL CHRONIC KNEE PAIN: Primary | ICD-10-CM

## 2024-04-11 DIAGNOSIS — M25.561 BILATERAL CHRONIC KNEE PAIN: Primary | ICD-10-CM

## 2024-04-11 PROCEDURE — 97140 MANUAL THERAPY 1/> REGIONS: CPT

## 2024-04-11 PROCEDURE — 97110 THERAPEUTIC EXERCISES: CPT

## 2024-04-11 NOTE — PROGRESS NOTES
Physical Therapy Treatment Note     Name: Naomi Parra  Clinic Number: [id number]    Therapy Diagnosis:   Bilateral knee pain, weakness, gait disturance    Physician: Order, Paper    Visit Date: 4/11/2024    Physician Orders: PT Eval and Treat  Medical Diagnosis from Referral: Unilateral osteoarthritis right knee; Unilateral osteoarthritis left knee; blateral knee degernative joint disease, medial joint space collapse with bone on bone and varus positioning  Evaluation Date: 2/27/2024  Authorization Period Expiration: medically necessary  Plan of Care Expiration: 5/21/2024  Visit # / Visits authorized: #7    Time In: 1045  Time Out: 1148  Total Billable Time:  53 minutes    Surgery: none  Orthopedic Precautions: none  Pertinent History: see medical chart    Subjective: Naomi reports that lateral leg and hip feels better but tends to come and go. Knees acting up with the change in weather. Agrees to PT session.    CMS Impairment/Limitation/Restriction for FOTO Survey  Therapist reviewed FOTO scores for Naomi Parra on 02/27/2024.   FOTO documents entered into [employer] - see Media section.     Intake: Patient's Physical FS Primary Measure: 31  Intake: Risk Adjusted Statistical FOTO: 42  Intake: Limitation Score: %  Category: Mobility  Intake:  KOOS, JR: 42.3% functional    4/4/2024: Patient's Physical FS Primary Measure: 31  Intake: Risk Adjusted Statistical FOTO: 42  4/4/2024: Limitation Score: %  4/4/2024: Category: Mobility  4/4/2024: KOOS, JR: 42.3% functional    Objective     Naomi received the following treatment:     Time Activities   Manual 15 DTM and trigger point release to the right IT band; passive stretch to the lateral hip, passive knee to chest, passive LTR, Biofreeze   TherAct      TherEx 38 Nustep, quad sets, heel slides, SAQ, SLR, hip abd, hip add squeezes, bridges, clamshell, standing marches, standing heel raises, seated hip flex, seated hip abd, LAQ   Gait     Neuro Re-ed     Modalities 10 Moist  heat to both knees prior to exercise.   E-Stim     Dry Needling     Canalith Repositioning         Home Exercises Provided and Patient Education Provided     Education provided:   -Plan of care, HEP, review with patient and she verbalizes completing them routinely at home.     Assessment   Naomi is a 88 y.o. female referred to outpatient Physical Therapy with a medical diagnosis of Unilateral osteoarthritis right knee; Unilateral osteoarthritis left knee; blateral knee degernative joint disease, medial joint space collapse with bone on bone and varus positioning. Patient presents with pain both knees with mild swellling, decreased strength both knees, decreased ROM both knees, limited functional mobility requiring the use of a 4 wheeled rolling walker all resulting in decreased functional capacity. Patient will benefit from skilled outpatient Physical Therapy to address the deficits stated above and in the chart below, provide education, and to maximize patient's level of independence.     Naomi with increased lateral leg and hip pain with trigger points. The knees are giving more trouble today as well which she attributes to the weather. With manual therapy we were able to minimize the trigger points and it did feel some better. She does get good relief with therapy but it does not last. Did ok with the exercises today but self limits reps of each if has any pain. She has difficulty with weight bearing for some standing exercises that require weight shift to one knee. This causes knee pain especially on the left so we modified and did more things sitting. She was able to complete program today with modifications and rest breaks. Will need to continue monitoring R hip/thigh pain and progress activity as tolerated.     Patient prognosis is Fair.      Anticipated barriers to physical therapy: age and long term nature of chronic B knee pain.     Goals: Naomi Is progressing well towards her goals.    Short Term Goals: 6  weeks   Patient will report at least 10% increase in functional capacity from initial LEFS score to indicate clinically significant functional improvement  Patient will report at least 10% increase on initial FOTO score to indicate clinically significant functional improvement  Patient will report at least 20% reduction in pain.     Long Term Goals: 12 weeks   Patient will report at least 20% increase in functional capacity from initial LEFS score to indicate clinically significant functional improvement  Patient will report at least 20% increase on initial FOTO score to indicate clinically significant functional improvement  Patient will report at least 50% overall perceived improvement since start of therapy.  Patient will demonstrate equal ROM at B knees for improved functional mobility  Patient will demonstrate equal strength B LE's for improved ease with walking and functional balance     Plan     Outpatient Physical Therapy 2-3 times weekly for 12 weeks to include the following interventions: Gait Training, Manual Therapy, Moist Heat/ Ice, Neuromuscular Re-ed, Patient Education, Self Care, Therapeutic Activities, and Therapeutic Exercise.       Stan Rose, PT

## 2024-04-15 ENCOUNTER — LAB VISIT (OUTPATIENT)
Dept: LAB | Facility: HOSPITAL | Age: 89
End: 2024-04-15
Attending: INTERNAL MEDICINE
Payer: MEDICARE

## 2024-04-15 DIAGNOSIS — E03.9 MYXEDEMA HEART DISEASE: Primary | ICD-10-CM

## 2024-04-15 DIAGNOSIS — I51.9 MYXEDEMA HEART DISEASE: Primary | ICD-10-CM

## 2024-04-15 DIAGNOSIS — E78.5 HYPERLIPEMIA: ICD-10-CM

## 2024-04-15 DIAGNOSIS — I10 ESSENTIAL HYPERTENSION, MALIGNANT: ICD-10-CM

## 2024-04-15 DIAGNOSIS — K21.9 ESOPHAGEAL REFLUX: ICD-10-CM

## 2024-04-15 LAB
ALBUMIN SERPL-MCNC: 3.6 G/DL (ref 3.4–4.8)
ALBUMIN/GLOB SERPL: 1.1 RATIO (ref 1.1–2)
ALP SERPL-CCNC: 37 UNIT/L (ref 40–150)
ALT SERPL-CCNC: 9 UNIT/L (ref 0–55)
AST SERPL-CCNC: 13 UNIT/L (ref 5–34)
BILIRUB SERPL-MCNC: 0.6 MG/DL
BUN SERPL-MCNC: 14 MG/DL (ref 9.8–20.1)
CALCIUM SERPL-MCNC: 9.9 MG/DL (ref 8.4–10.2)
CHLORIDE SERPL-SCNC: 106 MMOL/L (ref 98–107)
CHOLEST SERPL-MCNC: 204 MG/DL
CHOLEST/HDLC SERPL: 3 {RATIO} (ref 0–5)
CO2 SERPL-SCNC: 29 MMOL/L (ref 23–31)
CREAT SERPL-MCNC: 0.87 MG/DL (ref 0.55–1.02)
GFR SERPLBLD CREATININE-BSD FMLA CKD-EPI: >60 MLS/MIN/1.73/M2
GLOBULIN SER-MCNC: 3.3 GM/DL (ref 2.4–3.5)
GLUCOSE SERPL-MCNC: 89 MG/DL (ref 82–115)
HDLC SERPL-MCNC: 62 MG/DL (ref 35–60)
LDLC SERPL CALC-MCNC: 128 MG/DL (ref 50–140)
POTASSIUM SERPL-SCNC: 3.5 MMOL/L (ref 3.5–5.1)
PROT SERPL-MCNC: 6.9 GM/DL (ref 5.8–7.6)
SODIUM SERPL-SCNC: 144 MMOL/L (ref 136–145)
T3FREE SERPL-MCNC: 2.15 PG/ML (ref 1.58–3.91)
T4 FREE SERPL-MCNC: 1.31 NG/DL (ref 0.7–1.48)
TRIGL SERPL-MCNC: 69 MG/DL (ref 37–140)
TSH SERPL-ACNC: 1.27 UIU/ML (ref 0.35–4.94)
VLDLC SERPL CALC-MCNC: 14 MG/DL

## 2024-04-15 PROCEDURE — 84481 FREE ASSAY (FT-3): CPT

## 2024-04-15 PROCEDURE — 84443 ASSAY THYROID STIM HORMONE: CPT

## 2024-04-15 PROCEDURE — 80053 COMPREHEN METABOLIC PANEL: CPT

## 2024-04-15 PROCEDURE — 36415 COLL VENOUS BLD VENIPUNCTURE: CPT

## 2024-04-15 PROCEDURE — 80061 LIPID PANEL: CPT

## 2024-04-15 PROCEDURE — 84439 ASSAY OF FREE THYROXINE: CPT

## 2024-04-16 ENCOUNTER — CLINICAL SUPPORT (OUTPATIENT)
Dept: REHABILITATION | Facility: HOSPITAL | Age: 89
End: 2024-04-16
Payer: MEDICARE

## 2024-04-16 DIAGNOSIS — M25.562 BILATERAL CHRONIC KNEE PAIN: Primary | ICD-10-CM

## 2024-04-16 DIAGNOSIS — Z74.09 IMPAIRED FUNCTIONAL MOBILITY, BALANCE, GAIT, AND ENDURANCE: ICD-10-CM

## 2024-04-16 DIAGNOSIS — M25.561 BILATERAL CHRONIC KNEE PAIN: Primary | ICD-10-CM

## 2024-04-16 DIAGNOSIS — G89.29 BILATERAL CHRONIC KNEE PAIN: Primary | ICD-10-CM

## 2024-04-16 PROCEDURE — 97110 THERAPEUTIC EXERCISES: CPT

## 2024-04-16 PROCEDURE — 97140 MANUAL THERAPY 1/> REGIONS: CPT

## 2024-04-16 NOTE — PROGRESS NOTES
Physical Therapy Treatment Note     Name: Naomi Parra  Clinic Number: [id number]    Therapy Diagnosis:   Bilateral knee pain, weakness, gait disturance    Physician: Order, Paper    Visit Date: 4/16/2024    Physician Orders: PT Eval and Treat  Medical Diagnosis from Referral: Unilateral osteoarthritis right knee; Unilateral osteoarthritis left knee; blateral knee degernative joint disease, medial joint space collapse with bone on bone and varus positioning  Evaluation Date: 2/27/2024  Authorization Period Expiration: medically necessary  Plan of Care Expiration: 5/21/2024  Visit # / Visits authorized: visit #9    Time In: 1105  Time Out: 1200  Total Billable Time:  45 minutes    Surgery: none  Orthopedic Precautions: none  Pertinent History: see medical chart    Subjective: Naomi reports that lateral leg and hip feels better but continues to act up at times. Knees hurt with weight bearing but feels pretty good at rest. Agrees to PT session.    CMS Impairment/Limitation/Restriction for FOTO Survey  Therapist reviewed FOTO scores for Naomi Parra on 02/27/2024.   FOTO documents entered into [employer] - see Media section.     Intake: Patient's Physical FS Primary Measure: 31  Intake: Risk Adjusted Statistical FOTO: 42  Intake: Limitation Score: %  Category: Mobility  Intake:  KOJR GINNY: 42.3% functional    4/4/2024: Patient's Physical FS Primary Measure: 31  Intake: Risk Adjusted Statistical FOTO: 42  4/4/2024: Limitation Score: %  4/4/2024: Category: Mobility  4/4/2024: JR HUMAIRA: 42.3% functional    Objective     Naomi received the following treatment:     Time Activities   Manual 15 DTM and trigger point release to the right IT band; passive stretch to the lateral hip, passive knee to chest, passive LTR, Biofreeze   TherAct      TherEx 30 quad sets, heel slides, SAQ, SLR, hip abd, hip add squeezes, bridges, clamshell, standing marches, standing heel raises, seated hip flex, seated hip abd, LAQ   Gait     Neuro Re-ed      Modalities 10 Moist heat to both knees prior to exercise.   E-Stim     Dry Needling     Canalith Repositioning         Home Exercises Provided and Patient Education Provided     Education provided:   -Plan of care, HEP, review with patient and she verbalizes completing them routinely at home.     Assessment   Naomi is a 88 y.o. female referred to outpatient Physical Therapy with a medical diagnosis of Unilateral osteoarthritis right knee; Unilateral osteoarthritis left knee; blateral knee degernative joint disease, medial joint space collapse with bone on bone and varus positioning. Patient presents with pain both knees with mild swellling, decreased strength both knees, decreased ROM both knees, limited functional mobility requiring the use of a 4 wheeled rolling walker all resulting in decreased functional capacity. Patient will benefit from skilled outpatient Physical Therapy to address the deficits stated above and in the chart below, provide education, and to maximize patient's level of independence.     Naomi with improving lateral leg and hip pain. Trigger points are much less sensitive. The knees are  up and down but the pain with weight bearing is always there. With manual therapy we were able to minimize the trigger points of the lateral leg and it did feel some better. She does get good relief with therapy but it does not last. Did ok with the exercises today but self limits reps of each if has any pain. She has difficulty with weight bearing for some standing exercises that require weight shift to one knee. This causes knee pain especially on the left so we modified and did more things sitting. She was able to complete program today with modifications and rest breaks. Will need to continue monitoring R hip/thigh pain and progress activity as tolerated.     Patient prognosis is Fair.      Anticipated barriers to physical therapy: age and long term nature of chronic B knee pain.     Goals: Naomi Is  progressing well towards her goals.    Short Term Goals: 6 weeks   Patient will report at least 10% increase in functional capacity from initial LEFS score to indicate clinically significant functional improvement  Patient will report at least 10% increase on initial FOTO score to indicate clinically significant functional improvement  Patient will report at least 20% reduction in pain.     Long Term Goals: 12 weeks   Patient will report at least 20% increase in functional capacity from initial LEFS score to indicate clinically significant functional improvement  Patient will report at least 20% increase on initial FOTO score to indicate clinically significant functional improvement  Patient will report at least 50% overall perceived improvement since start of therapy.  Patient will demonstrate equal ROM at B knees for improved functional mobility  Patient will demonstrate equal strength B LE's for improved ease with walking and functional balance     Plan     Outpatient Physical Therapy 2-3 times weekly for 12 weeks to include the following interventions: Gait Training, Manual Therapy, Moist Heat/ Ice, Neuromuscular Re-ed, Patient Education, Self Care, Therapeutic Activities, and Therapeutic Exercise.       Stan Rose, PT

## 2024-04-18 ENCOUNTER — CLINICAL SUPPORT (OUTPATIENT)
Dept: REHABILITATION | Facility: HOSPITAL | Age: 89
End: 2024-04-18
Payer: MEDICARE

## 2024-04-18 DIAGNOSIS — G89.29 BILATERAL CHRONIC KNEE PAIN: Primary | ICD-10-CM

## 2024-04-18 DIAGNOSIS — M25.562 BILATERAL CHRONIC KNEE PAIN: Primary | ICD-10-CM

## 2024-04-18 DIAGNOSIS — Z74.09 IMPAIRED FUNCTIONAL MOBILITY, BALANCE, GAIT, AND ENDURANCE: ICD-10-CM

## 2024-04-18 DIAGNOSIS — M25.561 BILATERAL CHRONIC KNEE PAIN: Primary | ICD-10-CM

## 2024-04-18 PROCEDURE — 97110 THERAPEUTIC EXERCISES: CPT

## 2024-04-18 PROCEDURE — 97140 MANUAL THERAPY 1/> REGIONS: CPT

## 2024-04-18 NOTE — PROGRESS NOTES
Physical Therapy Treatment Note     Name: Naomi Parra  Clinic Number: [id number]    Therapy Diagnosis:   Bilateral knee pain, weakness, gait disturance    Physician: Order, Paper    Visit Date: 4/18/2024    Physician Orders: PT Eval and Treat  Medical Diagnosis from Referral: Unilateral osteoarthritis right knee; Unilateral osteoarthritis left knee; blateral knee degernative joint disease, medial joint space collapse with bone on bone and varus positioning  Evaluation Date: 2/27/2024  Authorization Period Expiration: medically necessary  Plan of Care Expiration: 5/21/2024  Visit # / Visits authorized: visit #10    Time In: 0918  Time Out: 1013  Total Billable Time:  45 minutes    Surgery: none  Orthopedic Precautions: none  Pertinent History: see medical chart    Subjective: Naomi reports that lateral leg and hip feels better but continues to act up at times. Knees hurt with weight bearing but feels pretty good at rest. Feels she is a little stronger. Agrees to PT session.    CMS Impairment/Limitation/Restriction for FOTO Survey  Therapist reviewed FOTO scores for Naomi Parra on 02/27/2024.   FOTO documents entered into [employer] - see Media section.     Intake: Patient's Physical FS Primary Measure: 31  Intake: Risk Adjusted Statistical FOTO: 42  Intake: Limitation Score: %  Category: Mobility  Intake:  JR HUMAIRA: 42.3% functional    4/4/2024: Patient's Physical FS Primary Measure: 31  Intake: Risk Adjusted Statistical FOTO: 42  4/4/2024: Limitation Score: %  4/4/2024: Category: Mobility  4/4/2024: JR HUMAIRA: 42.3% functional    Objective     Naomi received the following treatment:     Time Activities   Manual 15 DTM and trigger point release to the right IT band; passive stretch to the lateral hip, passive knee to chest, passive LTR, Biofreeze   TherAct      TherEx 30 quad sets, heel slides, SAQ, SLR, hip abd, hip add squeezes, bridges, clamshell, standing marches, standing heel raises, seated hip flex, seated  hip abd, LAQ   Gait     Neuro Re-ed     Modalities 10 Moist heat to both knees prior to exercise.   E-Stim     Dry Needling     Canalith Repositioning         Home Exercises Provided and Patient Education Provided     Education provided:   -Plan of care, HEP, review with patient and she verbalizes completing them routinely at home.     Assessment   Naomi is a 88 y.o. female referred to outpatient Physical Therapy with a medical diagnosis of Unilateral osteoarthritis right knee; Unilateral osteoarthritis left knee; blateral knee degernative joint disease, medial joint space collapse with bone on bone and varus positioning. Patient presents with pain both knees with mild swellling, decreased strength both knees, decreased ROM both knees, limited functional mobility requiring the use of a 4 wheeled rolling walker all resulting in decreased functional capacity. Patient will benefit from skilled outpatient Physical Therapy to address the deficits stated above and in the chart below, provide education, and to maximize patient's level of independence.     Naomi with improving lateral leg and hip pain but did have some sensitivity with some exercise today. Trigger points are much less sensitive.The knees are up and down but the pain with weight bearing is always there. With manual therapy we were able to minimize the trigger points of the lateral leg and it did feel some better. She does get good relief with therapy but it does not last, but is lasting longer. Did ok with the exercises today but continues to self limit at times if has any pain. She has difficulty with weight bearing for some standing exercises that require weight shift to one knee. This causes knee pain especially on the left so we modified and did more things sitting. She was able to complete program today with modifications and rest breaks. Will need to continue monitoring R hip/thigh pain and progress activity as tolerated.     Patient prognosis is Fair.       Anticipated barriers to physical therapy: age and long term nature of chronic B knee pain.     Goals: Naomi Is progressing well towards her goals.    Short Term Goals: 6 weeks   Patient will report at least 10% increase in functional capacity from initial LEFS score to indicate clinically significant functional improvement  Patient will report at least 10% increase on initial FOTO score to indicate clinically significant functional improvement  Patient will report at least 20% reduction in pain.     Long Term Goals: 12 weeks   Patient will report at least 20% increase in functional capacity from initial LEFS score to indicate clinically significant functional improvement  Patient will report at least 20% increase on initial FOTO score to indicate clinically significant functional improvement  Patient will report at least 50% overall perceived improvement since start of therapy.  Patient will demonstrate equal ROM at B knees for improved functional mobility  Patient will demonstrate equal strength B LE's for improved ease with walking and functional balance     Plan     Outpatient Physical Therapy 2-3 times weekly for 12 weeks to include the following interventions: Gait Training, Manual Therapy, Moist Heat/ Ice, Neuromuscular Re-ed, Patient Education, Self Care, Therapeutic Activities, and Therapeutic Exercise.       Stan Rose, PT

## 2024-04-23 ENCOUNTER — CLINICAL SUPPORT (OUTPATIENT)
Dept: REHABILITATION | Facility: HOSPITAL | Age: 89
End: 2024-04-23
Payer: MEDICARE

## 2024-04-23 DIAGNOSIS — M25.562 BILATERAL CHRONIC KNEE PAIN: Primary | ICD-10-CM

## 2024-04-23 DIAGNOSIS — Z74.09 IMPAIRED FUNCTIONAL MOBILITY, BALANCE, GAIT, AND ENDURANCE: ICD-10-CM

## 2024-04-23 DIAGNOSIS — M25.561 BILATERAL CHRONIC KNEE PAIN: Primary | ICD-10-CM

## 2024-04-23 DIAGNOSIS — G89.29 BILATERAL CHRONIC KNEE PAIN: Primary | ICD-10-CM

## 2024-04-23 PROCEDURE — 97140 MANUAL THERAPY 1/> REGIONS: CPT

## 2024-04-23 PROCEDURE — 97110 THERAPEUTIC EXERCISES: CPT

## 2024-04-23 NOTE — PROGRESS NOTES
"  Physical Therapy Treatment Note     Name: Naomi Parra  Clinic Number: [id number]    Therapy Diagnosis:   Bilateral knee pain, weakness, gait disturance    Physician: Order, Paper    Visit Date: 4/23/2024    Physician Orders: PT Eval and Treat  Medical Diagnosis from Referral: Unilateral osteoarthritis right knee; Unilateral osteoarthritis left knee; blateral knee degernative joint disease, medial joint space collapse with bone on bone and varus positioning  Evaluation Date: 2/27/2024  Authorization Period Expiration: medically necessary  Plan of Care Expiration: 5/21/2024  Visit # / Visits authorized: visit #11    Time In: 1054  Time Out: 1141  Total Billable Time:  47 minutes    Surgery: none  Orthopedic Precautions: none  Pertinent History: see medical chart    Subjective: Naomi reports increased R lateral leg and hip pain today. Unable to attribute it to anything "just tends to act up at times." Knees hurt with weight bearing but feels pretty good at rest. Feels she is a little stronger. Agrees to PT session.    Pain: 7/10; 5/10  Location: Right lateral leg; knees    CMS Impairment/Limitation/Restriction for FOTO Survey  Therapist reviewed FOTO scores for Naomi Parra on 02/27/2024.   FOTO documents entered into [employer] - see Media section.     Intake: Patient's Physical FS Primary Measure: 31  Intake: Risk Adjusted Statistical FOTO: 42  Intake: Limitation Score: %  Category: Mobility  Intake:  JR HUMAIRA: 42.3% functional    4/4/2024: Patient's Physical FS Primary Measure: 31  Intake: Risk Adjusted Statistical FOTO: 42  4/4/2024: Limitation Score: %  4/4/2024: Category: Mobility  4/4/2024: JR HUMAIRA: 42.3% functional    Objective     Naomi received the following treatment:     Time Activities   Manual 16 DTM and trigger point release to the right IT band; passive stretch to the lateral hip, passive knee to chest, passive LTR, Biofreeze   TherAct      TherEx 30 quad sets, heel slides, SAQ, SLR, hip abd, hip add " squeezes, bridges, clamshell, standing marches, standing heel raises, seated hip flex, seated hip abd, LAQ   Gait     Neuro Re-ed     Modalities 10 Moist heat to both knees prior to exercise.   E-Stim     Dry Needling     Canalith Repositioning         Home Exercises Provided and Patient Education Provided     Education provided:   -Plan of care, HEP, review with patient and she verbalizes completing them routinely at home.     Assessment   Naomi is a 88 y.o. female referred to outpatient Physical Therapy with a medical diagnosis of Unilateral osteoarthritis right knee; Unilateral osteoarthritis left knee; blateral knee degernative joint disease, medial joint space collapse with bone on bone and varus positioning. Patient presents with pain both knees with mild swellling, decreased strength both knees, decreased ROM both knees, limited functional mobility requiring the use of a 4 wheeled rolling walker all resulting in decreased functional capacity. Patient will benefit from skilled outpatient Physical Therapy to address the deficits stated above and in the chart below, provide education, and to maximize patient's level of independence.     Naomi with increased right lateral leg and hip pain today with increased sensitivity with some exercise today. Trigger points are more sensitive. The knees are up and down but the pain with weight bearing is always there. With manual therapy we were able to minimize the trigger points of the lateral leg and it did feel some better. She does get good relief with therapy but it does not last, but is lasting longer. Did ok with the exercises today but continues to self limit at times if has any pain. She has difficulty with weight bearing for some standing exercises that require weight shift to one knee. This causes knee pain especially on the left so we modified and did more things sitting. She was able to complete program today with modifications and rest breaks. Will need to  continue monitoring R hip/thigh pain and progress activity as tolerated.     Patient prognosis is Fair.      Anticipated barriers to physical therapy: age and long term nature of chronic B knee pain.     Goals: Naomi Is progressing well towards her goals.    Short Term Goals: 6 weeks   Patient will report at least 10% increase in functional capacity from initial LEFS score to indicate clinically significant functional improvement  Patient will report at least 10% increase on initial FOTO score to indicate clinically significant functional improvement  Patient will report at least 20% reduction in pain.     Long Term Goals: 12 weeks   Patient will report at least 20% increase in functional capacity from initial LEFS score to indicate clinically significant functional improvement  Patient will report at least 20% increase on initial FOTO score to indicate clinically significant functional improvement  Patient will report at least 50% overall perceived improvement since start of therapy.  Patient will demonstrate equal ROM at B knees for improved functional mobility  Patient will demonstrate equal strength B LE's for improved ease with walking and functional balance     Plan     Outpatient Physical Therapy 2-3 times weekly for 12 weeks to include the following interventions: Gait Training, Manual Therapy, Moist Heat/ Ice, Neuromuscular Re-ed, Patient Education, Self Care, Therapeutic Activities, and Therapeutic Exercise.       Stan Rose, PT

## 2024-04-25 ENCOUNTER — CLINICAL SUPPORT (OUTPATIENT)
Dept: REHABILITATION | Facility: HOSPITAL | Age: 89
End: 2024-04-25
Payer: MEDICARE

## 2024-04-25 DIAGNOSIS — M25.561 BILATERAL CHRONIC KNEE PAIN: Primary | ICD-10-CM

## 2024-04-25 DIAGNOSIS — M25.562 BILATERAL CHRONIC KNEE PAIN: Primary | ICD-10-CM

## 2024-04-25 DIAGNOSIS — M17.11 PRIMARY OSTEOARTHRITIS OF RIGHT KNEE: ICD-10-CM

## 2024-04-25 DIAGNOSIS — Z74.09 IMPAIRED FUNCTIONAL MOBILITY, BALANCE, GAIT, AND ENDURANCE: ICD-10-CM

## 2024-04-25 DIAGNOSIS — G89.29 BILATERAL CHRONIC KNEE PAIN: Primary | ICD-10-CM

## 2024-04-25 PROCEDURE — 97140 MANUAL THERAPY 1/> REGIONS: CPT

## 2024-04-25 PROCEDURE — 97110 THERAPEUTIC EXERCISES: CPT

## 2024-04-25 NOTE — PROGRESS NOTES
Physical Therapy Treatment Note     Name: Naomi Parra  Clinic Number: [id number]    Therapy Diagnosis:   Bilateral knee pain, weakness, gait disturance    Physician: Order, Paper    Visit Date: 4/25/2024    Physician Orders: PT Eval and Treat  Medical Diagnosis from Referral: Unilateral osteoarthritis right knee; Unilateral osteoarthritis left knee; blateral knee degernative joint disease, medial joint space collapse with bone on bone and varus positioning  Evaluation Date: 2/27/2024  Authorization Period Expiration: medically necessary  Plan of Care Expiration: 5/21/2024  Visit # / Visits authorized: visit #12    Time In: 0916  Time Out: 1016  Total Billable Time:  50 minutes    Surgery: none  Orthopedic Precautions: none  Pertinent History: see medical chart    Subjective: Naomi reports overall she is doing better but continues with up and down R lateral leg and hip pain today. Knees tend ot be up and sown as well but feels she can walk a little better. Feels she is a little stronger. Agrees to PT session.    Pain: 5/10; 4/10  Location: Right lateral leg; knees    CMS Impairment/Limitation/Restriction for FOTO Survey  Therapist reviewed FOTO scores for Naomi Parra on 02/27/2024.   FOTO documents entered into [employer] - see Media section.     Intake: Patient's Physical FS Primary Measure: 31  Intake: Risk Adjusted Statistical FOTO: 42  Intake: Limitation Score: %  Category: Mobility  Intake:  JR HUMAIRA: 42.3% functional    4/4/2024: Patient's Physical FS Primary Measure: 31  Intake: Risk Adjusted Statistical FOTO: 42  4/4/2024: Limitation Score: %  4/4/2024: Category: Mobility  4/4/2024: JR HUMAIRA: 42.3% functional    Objective     Naomi received the following treatment:     Time Activities   Manual 15 DTM and trigger point release to the right IT band; passive stretch to the lateral hip, passive knee to chest, passive LTR, Biofreeze   TherAct      TherEx 35 quad sets, heel slides, SAQ, SLR, hip abd, hip add  squeezes, bridges, clamshell, standing marches, standing heel raises, seated hip flex, seated hip abd, LAQ   Gait     Neuro Re-ed     Modalities 10 Moist heat to both knees prior to exercise.   E-Stim     Dry Needling     Canalith Repositioning         Home Exercises Provided and Patient Education Provided     Education provided:   -Plan of care, HEP, review with patient and she verbalizes completing them routinely at home.     Assessment   Noami is a 88 y.o. female referred to outpatient Physical Therapy with a medical diagnosis of Unilateral osteoarthritis right knee; Unilateral osteoarthritis left knee; blateral knee degernative joint disease, medial joint space collapse with bone on bone and varus positioning. Patient presents with pain both knees with mild swellling, decreased strength both knees, decreased ROM both knees, limited functional mobility requiring the use of a 4 wheeled rolling walker all resulting in decreased functional capacity. Patient will benefit from skilled outpatient Physical Therapy to address the deficits stated above and in the chart below, provide education, and to maximize patient's level of independence.     Naomi with up and down right lateral leg and hip pain but overall is doing better.Trigger points along lateral leg as less. No real knee pain unless weight bearing and weight shifting that is minimized with walker support. With manual therapy we were able to minimize the trigger points of the lateral leg and it did feel some better. She does get good relief with therapy but it does not last, but is lasting longer. Did ok with the exercises today but continues to self limit at times if has any pain. She has difficulty with weight bearing for some standing exercises that require weight shift to one knee. This causes knee pain especially on the left so we modified and did more things sitting. She was able to complete program today with modifications and rest breaks. Will need to  continue monitoring R hip/thigh pain and progress activity as tolerated.     Patient prognosis is Fair.      Anticipated barriers to physical therapy: age and long term nature of chronic B knee pain.     Goals: Naomi Is progressing well towards her goals.    Short Term Goals: 6 weeks   Patient will report at least 10% increase in functional capacity from initial LEFS score to indicate clinically significant functional improvement  Patient will report at least 10% increase on initial FOTO score to indicate clinically significant functional improvement  Patient will report at least 20% reduction in pain.     Long Term Goals: 12 weeks   Patient will report at least 20% increase in functional capacity from initial LEFS score to indicate clinically significant functional improvement  Patient will report at least 20% increase on initial FOTO score to indicate clinically significant functional improvement  Patient will report at least 50% overall perceived improvement since start of therapy.  Patient will demonstrate equal ROM at B knees for improved functional mobility  Patient will demonstrate equal strength B LE's for improved ease with walking and functional balance     Plan     Outpatient Physical Therapy 2-3 times weekly for 12 weeks to include the following interventions: Gait Training, Manual Therapy, Moist Heat/ Ice, Neuromuscular Re-ed, Patient Education, Self Care, Therapeutic Activities, and Therapeutic Exercise.       Stan Rose, PT

## 2024-04-30 ENCOUNTER — CLINICAL SUPPORT (OUTPATIENT)
Dept: REHABILITATION | Facility: HOSPITAL | Age: 89
End: 2024-04-30
Payer: MEDICARE

## 2024-04-30 DIAGNOSIS — M25.562 BILATERAL CHRONIC KNEE PAIN: Primary | ICD-10-CM

## 2024-04-30 DIAGNOSIS — Z74.09 IMPAIRED FUNCTIONAL MOBILITY, BALANCE, GAIT, AND ENDURANCE: ICD-10-CM

## 2024-04-30 DIAGNOSIS — M25.561 BILATERAL CHRONIC KNEE PAIN: Primary | ICD-10-CM

## 2024-04-30 DIAGNOSIS — G89.29 BILATERAL CHRONIC KNEE PAIN: Primary | ICD-10-CM

## 2024-04-30 PROCEDURE — 97110 THERAPEUTIC EXERCISES: CPT

## 2024-04-30 NOTE — PROGRESS NOTES
Physical Therapy Treatment Note     Name: Naomi Parra  Clinic Number: [id number]    Therapy Diagnosis:   Bilateral knee pain, weakness, gait disturance    Physician: Order, Paper    Visit Date: 4/30/2024    Physician Orders: PT Eval and Treat  Medical Diagnosis from Referral: Unilateral osteoarthritis right knee; Unilateral osteoarthritis left knee; blateral knee degernative joint disease, medial joint space collapse with bone on bone and varus positioning  Evaluation Date: 2/27/2024  Authorization Period Expiration: medically necessary  Plan of Care Expiration: 5/21/2024  Visit # / Visits authorized: visit #13    Time In: 1044  Time Out: 1136  Total Billable Time:  42 minutes    Surgery: none  Orthopedic Precautions: none  Pertinent History: see medical chart    Subjective: Naomi reports overall she is doing better but continues with up and down R lateral leg and hip pain today. Knees tend ot be up and sown as well but feels she can walk a little better. Feels she is a little stronger. Agrees to PT session.    Pain: 5/10; 4/10  Location: Right lateral leg; knees    CMS Impairment/Limitation/Restriction for FOTO Survey  Therapist reviewed FOTO scores for Naomi Parra on 02/27/2024.   FOTO documents entered into [employer] - see Media section.     Intake: Patient's Physical FS Primary Measure: 31  Intake: Risk Adjusted Statistical FOTO: 42  Intake: Limitation Score: 69%  Category: Mobility  Intake:  JR HUMAIRA: 42.3% functional    4/4/2024: Patient's Physical FS Primary Measure: 31  Intake: Risk Adjusted Statistical FOTO: 42  4/4/2024: Limitation Score: 69%  4/4/2024: Category: Mobility  4/4/2024: JR HUMAIRA: 42.3% functional    4/30/2024: Patient's Physical FS Primary Measure: 50  Intake: Risk Adjusted Statistical FOTO: 42  4/30/2024: Limitation Score: 50%  4/30/2024: Category: Mobility  4/30/2024: JR HUMAIRA: 59.4% functional    Objective     Naomi received the following treatment:     Time Activities   Manual Not done  today DTM and trigger point release to the right IT band; passive stretch to the lateral hip, passive knee to chest, passive LTR, Biofreeze   TherAct      TherEx 42 quad sets, heel slides, SAQ, SLR, hip abd, hip add squeezes, bridges, clamshell, standing marches, standing heel raises, seated hip flex, seated hip abd, LAQ, mini squats, navigating steps   Gait     Neuro Re-ed     Modalities 10 Moist heat to both knees prior to exercise.   E-Stim     Dry Needling     Canalith Repositioning         Home Exercises Provided and Patient Education Provided     Education provided:   -Plan of care, HEP, review with patient and she verbalizes completing them routinely at home.     Assessment   Naomi is a 88 y.o. female referred to outpatient Physical Therapy with a medical diagnosis of Unilateral osteoarthritis right knee; Unilateral osteoarthritis left knee; blateral knee degernative joint disease, medial joint space collapse with bone on bone and varus positioning. Patient presents with pain both knees with mild swellling, decreased strength both knees, decreased ROM both knees, limited functional mobility requiring the use of a 4 wheeled rolling walker all resulting in decreased functional capacity. Patient will benefit from skilled outpatient Physical Therapy to address the deficits stated above and in the chart below, provide education, and to maximize patient's level of independence.     Naomi continues to be up and down with right lateral leg and hip pain, both knees but overall is doing better. He score on the functional outcome tool shows improvement going from 69% limitation to 50%. She does not have any real knee pain unless weight bearing and weight shifting that is minimized with walker support. She does get good relief with therapy but it does not last, but is lasting longer. Did ok with the exercises today but continues to self limit at times if has any pain. Weight bearing and weight shifting for some standing  exercises that require weight shift to one knee at a time gives her trouble. She was able to complete the program today with modifications and rest breaks.  We had her navigate up and down some steps and she ok. Will need to continue monitoring R hip/thigh pain and progress activity as tolerated.     Patient prognosis is Fair.      Anticipated barriers to physical therapy: age and long term nature of chronic B knee pain.     Goals: Naomi Is progressing well towards her goals.    Short Term Goals: 6 weeks   Patient will report at least 10% increase in functional capacity from initial LEFS score to indicate clinically significant functional improvement  Patient will report at least 10% increase on initial FOTO score to indicate clinically significant functional improvement  Patient will report at least 20% reduction in pain.     Long Term Goals: 12 weeks   Patient will report at least 20% increase in functional capacity from initial LEFS score to indicate clinically significant functional improvement  Patient will report at least 20% increase on initial FOTO score to indicate clinically significant functional improvement  Patient will report at least 50% overall perceived improvement since start of therapy.  Patient will demonstrate equal ROM at B knees for improved functional mobility  Patient will demonstrate equal strength B LE's for improved ease with walking and functional balance     Plan     Outpatient Physical Therapy 2-3 times weekly for 12 weeks to include the following interventions: Gait Training, Manual Therapy, Moist Heat/ Ice, Neuromuscular Re-ed, Patient Education, Self Care, Therapeutic Activities, and Therapeutic Exercise.       Stan Rose, PT

## 2024-05-02 ENCOUNTER — CLINICAL SUPPORT (OUTPATIENT)
Dept: REHABILITATION | Facility: HOSPITAL | Age: 89
End: 2024-05-02
Payer: MEDICARE

## 2024-05-02 DIAGNOSIS — Z74.09 IMPAIRED FUNCTIONAL MOBILITY, BALANCE, GAIT, AND ENDURANCE: ICD-10-CM

## 2024-05-02 DIAGNOSIS — M25.562 BILATERAL CHRONIC KNEE PAIN: Primary | ICD-10-CM

## 2024-05-02 DIAGNOSIS — G89.29 BILATERAL CHRONIC KNEE PAIN: Primary | ICD-10-CM

## 2024-05-02 DIAGNOSIS — M25.561 BILATERAL CHRONIC KNEE PAIN: Primary | ICD-10-CM

## 2024-05-02 PROCEDURE — 97014 ELECTRIC STIMULATION THERAPY: CPT

## 2024-05-02 PROCEDURE — 97110 THERAPEUTIC EXERCISES: CPT

## 2024-05-02 NOTE — PROGRESS NOTES
"  Physical Therapy Treatment Note     Name: Naomi Parra  Clinic Number: [id number]    Therapy Diagnosis:   Bilateral knee pain, weakness, gait disturance    Physician: Order, Paper    Visit Date: 5/2/2024    Physician Orders: PT Eval and Treat  Medical Diagnosis from Referral: Unilateral osteoarthritis right knee; Unilateral osteoarthritis left knee; blateral knee degernative joint disease, medial joint space collapse with bone on bone and varus positioning  Evaluation Date: 2/27/2024  Authorization Period Expiration: medically necessary  Plan of Care Expiration: 5/21/2024  Visit # / Visits authorized: visit #14    Time In: 0915  Time Out: 1013  Total Billable Time: 58 minutes    Surgery: none  Orthopedic Precautions: none  Pertinent History: see medical chart    Subjective: Naomi reports that she is hurting a little more today which she attributes to the weather. Her hip continues to give her trouble and states that is not bad when walking "but some of your exercises makes it hurt."  Agrees to PT session.    Pain: 4/10; 4/10  Location: Right hip/lateral leg; knees    CMS Impairment/Limitation/Restriction for FOTO Survey  Therapist reviewed FOTO scores for Naomi Parra on 02/27/2024.   FOTO documents entered into [employer] - see Media section.     Intake: Patient's Physical FS Primary Measure: 31  Intake: Risk Adjusted Statistical FOTO: 42  Intake: Limitation Score: 69%  Category: Mobility  Intake:  JR HUMAIRA: 42.3% functional    4/4/2024: Patient's Physical FS Primary Measure: 31  Intake: Risk Adjusted Statistical FOTO: 42  4/4/2024: Limitation Score: 69%  4/4/2024: Category: Mobility  4/4/2024: JR HUMAIRA: 42.3% functional    4/30/2024: Patient's Physical FS Primary Measure: 50  Intake: Risk Adjusted Statistical FOTO: 42  4/30/2024: Limitation Score: 50%  4/30/2024: Category: Mobility  4/30/2024: JR HUMAIRA: 59.4% functional    Objective     Naomi received the following treatment:     Time Activities   Manual Not done " today DTM and trigger point release to the right IT band; passive stretch to the lateral hip, passive knee to chest, passive LTR, Biofreeze   TherAct      TherEx 38 quad sets, heel slides, SAQ, SLR, hip abd, hip add squeezes, bridges, clamshell, standing marches, standing heel raises, seated hip flex, seated hip abd, LAQ, mini squats, navigating steps   Gait     Neuro Re-ed     Modalities 10 Moist heat to both knees prior to exercise.   E-Stim 20 IFC with moist heat to the right hip and lateral leg   Dry Needling     Canalith Repositioning         Home Exercises Provided and Patient Education Provided     Education provided:   -Plan of care, HEP, review with patient and she verbalizes completing them routinely at home.     Assessment   Naomi is a 88 y.o. female referred to outpatient Physical Therapy with a medical diagnosis of Unilateral osteoarthritis right knee; Unilateral osteoarthritis left knee; blateral knee degernative joint disease, medial joint space collapse with bone on bone and varus positioning. Patient presents with pain both knees with mild swellling, decreased strength both knees, decreased ROM both knees, limited functional mobility requiring the use of a 4 wheeled rolling walker all resulting in decreased functional capacity. Patient will benefit from skilled outpatient Physical Therapy to address the deficits stated above and in the chart below, provide education, and to maximize patient's level of independence.     Naomi continues to be up and down with right lateral leg and hip pain. Her hip and lateral leg pain is affecting performance with the knee exercises. Did ok with the exercises today but continues to self limit at times if has any pain. Today we did some IFC e-stim with moist heat and it was beneficial. She was able to navigate some steps today.  Weight bearing and weight shifting for some standing exercises that require weight shift to one knee at a time gives her trouble. We will  continue with the plan of care and progress as tolerated.    Patient prognosis is Fair.      Anticipated barriers to physical therapy: age and long term nature of chronic B knee pain.     Goals: Naomi Is progressing well towards her goals.    Short Term Goals: 6 weeks   Patient will report at least 10% increase in functional capacity from initial LEFS score to indicate clinically significant functional improvement  Patient will report at least 10% increase on initial FOTO score to indicate clinically significant functional improvement  Patient will report at least 20% reduction in pain.     Long Term Goals: 12 weeks   Patient will report at least 20% increase in functional capacity from initial LEFS score to indicate clinically significant functional improvement  Patient will report at least 20% increase on initial FOTO score to indicate clinically significant functional improvement  Patient will report at least 50% overall perceived improvement since start of therapy.  Patient will demonstrate equal ROM at B knees for improved functional mobility  Patient will demonstrate equal strength B LE's for improved ease with walking and functional balance     Plan     Outpatient Physical Therapy 2-3 times weekly for 12 weeks to include the following interventions: Gait Training, Manual Therapy, Moist Heat/ Ice, Neuromuscular Re-ed, Patient Education, Self Care, Therapeutic Activities, and Therapeutic Exercise.       Stan Rose, PT

## 2024-05-07 ENCOUNTER — CLINICAL SUPPORT (OUTPATIENT)
Dept: REHABILITATION | Facility: HOSPITAL | Age: 89
End: 2024-05-07
Payer: MEDICARE

## 2024-05-07 DIAGNOSIS — M25.561 BILATERAL CHRONIC KNEE PAIN: Primary | ICD-10-CM

## 2024-05-07 DIAGNOSIS — Z74.09 IMPAIRED FUNCTIONAL MOBILITY, BALANCE, GAIT, AND ENDURANCE: ICD-10-CM

## 2024-05-07 DIAGNOSIS — G89.29 BILATERAL CHRONIC KNEE PAIN: Primary | ICD-10-CM

## 2024-05-07 DIAGNOSIS — M25.562 BILATERAL CHRONIC KNEE PAIN: Primary | ICD-10-CM

## 2024-05-07 PROCEDURE — 97140 MANUAL THERAPY 1/> REGIONS: CPT | Mod: KX

## 2024-05-07 PROCEDURE — 97110 THERAPEUTIC EXERCISES: CPT | Mod: KX

## 2024-05-07 NOTE — PROGRESS NOTES
Physical Therapy Treatment Note     Name: Naomi Parra  Clinic Number: [id number]    Therapy Diagnosis:   Bilateral knee pain, weakness, gait disturance    Physician: Order, Paper    Visit Date: 5/7/2024    Physician Orders: PT Eval and Treat  Medical Diagnosis from Referral: Unilateral osteoarthritis right knee; Unilateral osteoarthritis left knee; blateral knee degernative joint disease, medial joint space collapse with bone on bone and varus positioning  Evaluation Date: 2/27/2024  Authorization Period Expiration: medically necessary  Plan of Care Expiration: 5/21/2024  Visit # / Visits authorized: visit #14    Time In: 1046  Time Out: 1146  Total Billable Time: 50 minutes    Surgery: none  Orthopedic Precautions: none  Pertinent History: see medical chart    Subjective: Naomi reports that she is hurting a little more today again which she attributes to the weather. The right knee and right hip are giving her the most trouble today. Agrees to PT session.    Pain: 6/10; 6/10, 4/10  Location: Right hip/lateral leg; R knee; L knee    CMS Impairment/Limitation/Restriction for FOTO Survey  Therapist reviewed FOTO scores for Naomi Parra on 02/27/2024.   FOTO documents entered into [employer] - see Media section.     Intake: Patient's Physical FS Primary Measure: 31  Intake: Risk Adjusted Statistical FOTO: 42  Intake: Limitation Score: 69%  Category: Mobility  Intake:  JR HUMAIRA: 42.3% functional    4/4/2024: Patient's Physical FS Primary Measure: 31  Intake: Risk Adjusted Statistical FOTO: 42  4/4/2024: Limitation Score: 69%  4/4/2024: Category: Mobility  4/4/2024: JR HUMAIRA: 42.3% functional    4/30/2024: Patient's Physical FS Primary Measure: 50  Intake: Risk Adjusted Statistical FOTO: 42  4/30/2024: Limitation Score: 50%  4/30/2024: Category: Mobility  4/30/2024: JR HUMAIRA: 59.4% functional    Objective     Naomi received the following treatment:     Time Activities   Manual 10 DTM and trigger point release to the  right IT band; passive stretch to the lateral hip, passive knee to chest, passive LTR   TherAct      TherEx 40 quad sets, heel slides, SAQ, SLR, hip abd, hip add squeezes, bridges, clamshell, standing marches, standing heel raises, seated hip flex, seated hip abd, LAQ, mini squats, navigating steps   Gait     Neuro Re-ed     Modalities 10 Moist heat to both knees prior to exercise.   E-Stim Not done today IFC with moist heat to the right hip and lateral leg   Dry Needling     Canalith Repositioning         Home Exercises Provided and Patient Education Provided     Education provided:   -Plan of care, HEP, review with patient and she verbalizes completing them routinely at home.     Assessment   Naomi is a 88 y.o. female referred to outpatient Physical Therapy with a medical diagnosis of Unilateral osteoarthritis right knee; Unilateral osteoarthritis left knee; blateral knee degernative joint disease, medial joint space collapse with bone on bone and varus positioning. Patient presents with pain both knees with mild swellling, decreased strength both knees, decreased ROM both knees, limited functional mobility requiring the use of a 4 wheeled rolling walker all resulting in decreased functional capacity. Patient will benefit from skilled outpatient Physical Therapy to address the deficits stated above and in the chart below, provide education, and to maximize patient's level of independence.     Naomi continues to be up and down with right lateral leg and hip pain. At times her hip and lateral leg pain affects performance with the knee exercises. Today the knees are hurting more than usual especially the right. She did ok with the exercises today but continues to self limit at times if she has any pain. She was able to navigate some steps today and did struggle some.  Weight bearing and weight shifting for some standing exercises that require weight shift to one knee at a time gives her trouble. We will continue with  the plan of care and progress as tolerated.    Patient prognosis is Fair.      Anticipated barriers to physical therapy: age and long term nature of chronic B knee pain.     Goals: Naomi Is progressing well towards her goals.    Short Term Goals: 6 weeks   Patient will report at least 10% increase in functional capacity from initial LEFS score to indicate clinically significant functional improvement  Patient will report at least 10% increase on initial FOTO score to indicate clinically significant functional improvement  Patient will report at least 20% reduction in pain.     Long Term Goals: 12 weeks   Patient will report at least 20% increase in functional capacity from initial LEFS score to indicate clinically significant functional improvement  Patient will report at least 20% increase on initial FOTO score to indicate clinically significant functional improvement  Patient will report at least 50% overall perceived improvement since start of therapy.  Patient will demonstrate equal ROM at B knees for improved functional mobility  Patient will demonstrate equal strength B LE's for improved ease with walking and functional balance     Plan     Outpatient Physical Therapy 2-3 times weekly for 12 weeks to include the following interventions: Gait Training, Manual Therapy, Moist Heat/ Ice, Neuromuscular Re-ed, Patient Education, Self Care, Therapeutic Activities, and Therapeutic Exercise.       Stan Rose, PT

## 2024-05-09 ENCOUNTER — CLINICAL SUPPORT (OUTPATIENT)
Dept: REHABILITATION | Facility: HOSPITAL | Age: 89
End: 2024-05-09
Payer: MEDICARE

## 2024-05-09 DIAGNOSIS — Z74.09 IMPAIRED FUNCTIONAL MOBILITY, BALANCE, GAIT, AND ENDURANCE: ICD-10-CM

## 2024-05-09 DIAGNOSIS — G89.29 BILATERAL CHRONIC KNEE PAIN: Primary | ICD-10-CM

## 2024-05-09 DIAGNOSIS — M25.561 BILATERAL CHRONIC KNEE PAIN: Primary | ICD-10-CM

## 2024-05-09 DIAGNOSIS — M25.562 BILATERAL CHRONIC KNEE PAIN: Primary | ICD-10-CM

## 2024-05-09 PROCEDURE — 97110 THERAPEUTIC EXERCISES: CPT | Mod: KX

## 2024-05-09 PROCEDURE — 97530 THERAPEUTIC ACTIVITIES: CPT | Mod: KX

## 2024-05-09 NOTE — PROGRESS NOTES
Physical Therapy Treatment Note     Name: Naomi Parra  Clinic Number: [id number]    Therapy Diagnosis:   Bilateral knee pain, weakness, gait disturance    Physician: Order, Paper    Visit Date: 5/9/2024    Physician Orders: PT Eval and Treat  Medical Diagnosis from Referral: Unilateral osteoarthritis right knee; Unilateral osteoarthritis left knee; blateral knee degernative joint disease, medial joint space collapse with bone on bone and varus positioning  Evaluation Date: 2/27/2024  Authorization Period Expiration: medically necessary  Plan of Care Expiration: 5/21/2024  Visit # / Visits authorized: visit #15    Time In: 0921  Time Out:   Total Billable Time:  minutes    Surgery: none  Orthopedic Precautions: none  Pertinent History: see medical chart    Subjective: Naomi reports that she is hurting a little more today again which she attributes to the weather. The right knee and right hip are giving her the most trouble today. Agrees to PT session.    Pain: 6/10; 6/10, 4/10  Location: Right hip/lateral leg; R knee; L knee    CMS Impairment/Limitation/Restriction for FOTO Survey  Therapist reviewed FOTO scores for Naomi Parra on 02/27/2024.   FOTO documents entered into [employer] - see Media section.     Intake: Patient's Physical FS Primary Measure: 31  Intake: Risk Adjusted Statistical FOTO: 42  Intake: Limitation Score: 69%  Category: Mobility  Intake:  JR HUMAIRA: 42.3% functional    4/4/2024: Patient's Physical FS Primary Measure: 31  Intake: Risk Adjusted Statistical FOTO: 42  4/4/2024: Limitation Score: 69%  4/4/2024: Category: Mobility  4/4/2024: JR HUMAIRA: 42.3% functional    4/30/2024: Patient's Physical FS Primary Measure: 50  Intake: Risk Adjusted Statistical FOTO: 42  4/30/2024: Limitation Score: 50%  4/30/2024: Category: Mobility  4/30/2024: JR HUMAIRA: 59.4% functional    Objective     Naomi received the following treatment:     Time Activities   Manual 10 DTM and trigger point release to the right IT  band; passive stretch to the lateral hip, passive knee to chest, passive LTR   TherAct      TherEx 40 quad sets, heel slides, SAQ, SLR, hip abd, hip add squeezes, bridges, clamshell, standing marches, standing heel raises, seated hip flex, seated hip abd, LAQ, mini squats, navigating steps   Gait     Neuro Re-ed     Modalities 10 Moist heat to both knees prior to exercise.   E-Stim Not done today IFC with moist heat to the right hip and lateral leg   Dry Needling     Canalith Repositioning         Home Exercises Provided and Patient Education Provided     Education provided:   -Plan of care, HEP, review with patient and she verbalizes completing them routinely at home.     Assessment   Naomi is a 88 y.o. female referred to outpatient Physical Therapy with a medical diagnosis of Unilateral osteoarthritis right knee; Unilateral osteoarthritis left knee; blateral knee degernative joint disease, medial joint space collapse with bone on bone and varus positioning. Patient presents with pain both knees with mild swellling, decreased strength both knees, decreased ROM both knees, limited functional mobility requiring the use of a 4 wheeled rolling walker all resulting in decreased functional capacity. Patient will benefit from skilled outpatient Physical Therapy to address the deficits stated above and in the chart below, provide education, and to maximize patient's level of independence.     Naomi continues to be up and down with right lateral leg and hip pain. At times her hip and lateral leg pain affects performance with the knee exercises. Today the knees are hurting more than usual especially the right. She did ok with the exercises today but continues to self limit at times if she has any pain. She was able to navigate some steps today and did struggle some.  Weight bearing and weight shifting for some standing exercises that require weight shift to one knee at a time gives her trouble. We will continue with the plan  of care and progress as tolerated.    Patient prognosis is Fair.      Anticipated barriers to physical therapy: age and long term nature of chronic B knee pain.     Goals: Naomi Is progressing well towards her goals.    Short Term Goals: 6 weeks   Patient will report at least 10% increase in functional capacity from initial LEFS score to indicate clinically significant functional improvement  Patient will report at least 10% increase on initial FOTO score to indicate clinically significant functional improvement  Patient will report at least 20% reduction in pain.     Long Term Goals: 12 weeks   Patient will report at least 20% increase in functional capacity from initial LEFS score to indicate clinically significant functional improvement  Patient will report at least 20% increase on initial FOTO score to indicate clinically significant functional improvement  Patient will report at least 50% overall perceived improvement since start of therapy.  Patient will demonstrate equal ROM at B knees for improved functional mobility  Patient will demonstrate equal strength B LE's for improved ease with walking and functional balance     Plan     Outpatient Physical Therapy 2-3 times weekly for 12 weeks to include the following interventions: Gait Training, Manual Therapy, Moist Heat/ Ice, Neuromuscular Re-ed, Patient Education, Self Care, Therapeutic Activities, and Therapeutic Exercise.       Stan Rose, PT

## 2024-05-09 NOTE — PROGRESS NOTES
Physical Therapy Treatment Note     Name: Naomi Parra  Clinic Number: [id number]    Therapy Diagnosis:   Bilateral knee pain, weakness, gait disturance    Physician: Order, Paper    Visit Date: 5/9/2024    Physician Orders: PT Eval and Treat  Medical Diagnosis from Referral: Unilateral osteoarthritis right knee; Unilateral osteoarthritis left knee; blateral knee degernative joint disease, medial joint space collapse with bone on bone and varus positioning  Evaluation Date: 2/27/2024  Authorization Period Expiration: medically necessary  Plan of Care Expiration: 5/21/2024  Visit # / Visits authorized: visit #16    Time In: 0941  Time Out: 1031  Total Billable Time: 50 minutes    Surgery: none  Orthopedic Precautions: none  Pertinent History: see medical chart    Subjective: Naomi reports that her knees are not too bad today but they are hurting. No pain in the hip. Feels she is walking a little better with her rollator walker. Agrees to PT session.    Pain: 0/10; 4/10, 6/10  Location: Right hip/lateral leg; R knee; L knee    CMS Impairment/Limitation/Restriction for FOTO Survey  Therapist reviewed FOTO scores for Naomi Parra on 02/27/2024.   FOTO documents entered into [employer] - see Media section.     Intake: Patient's Physical FS Primary Measure: 31  Intake: Risk Adjusted Statistical FOTO: 42  Intake: Limitation Score: 69%  Category: Mobility  Intake:  JR HUMAIRA: 42.3% functional    4/4/2024: Patient's Physical FS Primary Measure: 31  Intake: Risk Adjusted Statistical FOTO: 42  4/4/2024: Limitation Score: 69%  4/4/2024: Category: Mobility  4/4/2024: JR HUMAIRA: 42.3% functional    4/30/2024: Patient's Physical FS Primary Measure: 50  Intake: Risk Adjusted Statistical FOTO: 42  4/30/2024: Limitation Score: 50%  4/30/2024: Category: Mobility  4/30/2024: JR HUMAIRA: 59.4% functional    Objective     Naomi received the following treatment:     Time Activities   Manual Not done today DTM and trigger point release to the  right IT band; passive stretch to the lateral hip, passive knee to chest, passive LTR   TherAct 10  Lateral side stepping along wall, navigating steps with rail   TherEx 40 quad sets, heel slides, SAQ, SLR, hip abd, hip add squeezes, bridges, clamshell, standing marches, standing heel raises, seated hip flex, seated hip abd, LAQ, mini squats, navigating steps   Gait     Neuro Re-ed     Modalities 10 Moist heat to both knees prior to exercise.   E-Stim Not done today IFC with moist heat to the right hip and lateral leg   Dry Needling     Canalith Repositioning         Home Exercises Provided and Patient Education Provided     Education provided:   -Plan of care, HEP, review with patient and she verbalizes completing them routinely at home.     Assessment   Naomi is a 88 y.o. female referred to outpatient Physical Therapy with a medical diagnosis of Unilateral osteoarthritis right knee; Unilateral osteoarthritis left knee; blateral knee degernative joint disease, medial joint space collapse with bone on bone and varus positioning. Patient presents with pain both knees with mild swellling, decreased strength both knees, decreased ROM both knees, limited functional mobility requiring the use of a 4 wheeled rolling walker all resulting in decreased functional capacity. Patient will benefit from skilled outpatient Physical Therapy to address the deficits stated above and in the chart below, provide education, and to maximize patient's level of independence.     Naomi continues to be up and down with right lateral leg and hip pain but overall today was a good day with the exercises. We added lateral side stepping along the wall and again navigated up and down 4 steps. She did struggle some coming down the steps. Weight bearing and weight shifting for some standing exercises that require weight shift to one knee at a time gives her trouble. We will continue with the plan of care and progress as tolerated.    Patient  prognosis is Fair.      Anticipated barriers to physical therapy: age and long term nature of chronic B knee pain.     Goals: Naomi Is progressing well towards her goals.    Short Term Goals: 6 weeks   Patient will report at least 10% increase in functional capacity from initial LEFS score to indicate clinically significant functional improvement  Patient will report at least 10% increase on initial FOTO score to indicate clinically significant functional improvement  Patient will report at least 20% reduction in pain.     Long Term Goals: 12 weeks   Patient will report at least 20% increase in functional capacity from initial LEFS score to indicate clinically significant functional improvement  Patient will report at least 20% increase on initial FOTO score to indicate clinically significant functional improvement  Patient will report at least 50% overall perceived improvement since start of therapy.  Patient will demonstrate equal ROM at B knees for improved functional mobility  Patient will demonstrate equal strength B LE's for improved ease with walking and functional balance     Plan     Outpatient Physical Therapy 2-3 times weekly for 12 weeks to include the following interventions: Gait Training, Manual Therapy, Moist Heat/ Ice, Neuromuscular Re-ed, Patient Education, Self Care, Therapeutic Activities, and Therapeutic Exercise.       Stan Rose, PT

## 2024-05-14 ENCOUNTER — CLINICAL SUPPORT (OUTPATIENT)
Dept: REHABILITATION | Facility: HOSPITAL | Age: 89
End: 2024-05-14
Payer: MEDICARE

## 2024-05-14 DIAGNOSIS — M25.562 BILATERAL CHRONIC KNEE PAIN: Primary | ICD-10-CM

## 2024-05-14 DIAGNOSIS — G89.29 BILATERAL CHRONIC KNEE PAIN: Primary | ICD-10-CM

## 2024-05-14 DIAGNOSIS — M25.561 BILATERAL CHRONIC KNEE PAIN: Primary | ICD-10-CM

## 2024-05-14 DIAGNOSIS — Z74.09 IMPAIRED FUNCTIONAL MOBILITY, BALANCE, GAIT, AND ENDURANCE: ICD-10-CM

## 2024-05-14 PROCEDURE — 97110 THERAPEUTIC EXERCISES: CPT

## 2024-05-14 PROCEDURE — 97530 THERAPEUTIC ACTIVITIES: CPT

## 2024-05-14 PROCEDURE — 97032 APPL MODALITY 1+ESTIM EA 15: CPT

## 2024-05-14 NOTE — PROGRESS NOTES
Physical Therapy Treatment Note     Name: Naomi Parra  Clinic Number: [id number]    Therapy Diagnosis:   Bilateral knee pain, weakness, gait disturance    Physician: Order, Paper    Visit Date: 5/14/2024    Physician Orders: PT Eval and Treat  Medical Diagnosis from Referral: Unilateral osteoarthritis right knee; Unilateral osteoarthritis left knee; blateral knee degernative joint disease, medial joint space collapse with bone on bone and varus positioning  Evaluation Date: 2/27/2024  Authorization Period Expiration: medically necessary  Plan of Care Expiration: 5/21/2024  Visit # / Visits authorized: visit #17    Time In: 1047  Time Out: 1153  Total Billable Time: 66 minutes    Surgery: none  Orthopedic Precautions: none  Pertinent History: see medical chart    Subjective: Naomi reports that her knees are hurting but not too bad. States that the weather does not help. No pain in the hip when walking but with exercise it will hurt. Feels she can go up steps better but coming down still real difficult. Agrees to PT session.    Pain: 6/10; 5/10, 4/10  Location: Right hip/lateral leg; R knee; L knee    CMS Impairment/Limitation/Restriction for FOTO Survey  Therapist reviewed FOTO scores for Naomi Parra on 02/27/2024.   FOTO documents entered into [employer] - see Media section.     Intake: Patient's Physical FS Primary Measure: 31  Intake: Risk Adjusted Statistical FOTO: 42  Intake: Limitation Score: 69%  Category: Mobility  Intake:  HUMAIRA JR: 42.3% functional    4/4/2024: Patient's Physical FS Primary Measure: 31  Intake: Risk Adjusted Statistical FOTO: 42  4/4/2024: Limitation Score: 69%  4/4/2024: Category: Mobility  4/4/2024: KOOS, JR: 42.3% functional    4/30/2024: Patient's Physical FS Primary Measure: 50  Intake: Risk Adjusted Statistical FOTO: 42  4/30/2024: Limitation Score: 50%  4/30/2024: Category: Mobility  4/30/2024: KOOS, JR: 59.4% functional    Objective     Naomi received the following treatment:      Time Activities   Manual Not done today DTM and trigger point release to the right IT band; passive stretch to the lateral hip, passive knee to chest, passive LTR   TherAct 13 Lateral side stepping along wall, navigating steps with rail   TherEx 38 quad sets, heel slides, SAQ, SLR, hip abd, hip add squeezes, bridges, clamshell, standing marches, standing heel raises, seated hip flex, seated hip abd, LAQ, mini squats, navigating steps   Gait     Neuro Re-ed     Modalities Not done Moist heat to both knees prior to exercise.   E-Stim 15 IFC with moist heat to the right hip and lateral leg, moist heat to both knees   Dry Needling     Canalith Repositioning         Home Exercises Provided and Patient Education Provided     Education provided:   -Plan of care, HEP, review with patient and she verbalizes completing them routinely at home.     Assessment   Naomi is a 88 y.o. female referred to outpatient Physical Therapy with a medical diagnosis of Unilateral osteoarthritis right knee; Unilateral osteoarthritis left knee; blateral knee degernative joint disease, medial joint space collapse with bone on bone and varus positioning. Patient presents with pain both knees with mild swellling, decreased strength both knees, decreased ROM both knees, limited functional mobility requiring the use of a 4 wheeled rolling walker all resulting in decreased functional capacity. Patient will benefit from skilled outpatient Physical Therapy to address the deficits stated above and in the chart below, provide education, and to maximize patient's level of independence.     Naomi continues to be up and down with right lateral leg and hip pain but overall today was a good day with the exercises. We continued with lateral side stepping along the wall and again navigated up and down 6 steps. She does struggle with coming down the steps. Weight bearing and weight shifting for some standing exercises that require weight shift to one knee at a  time gives her trouble. The e-stim was  beneficial with right lateral hip pain.We will continue with the plan of care and progress as tolerated.    Patient prognosis is Fair.      Anticipated barriers to physical therapy: age and long term nature of chronic B knee pain.     Goals: Naomi Is progressing well towards her goals.    Short Term Goals: 6 weeks   Patient will report at least 10% increase in functional capacity from initial LEFS score to indicate clinically significant functional improvement  Patient will report at least 10% increase on initial FOTO score to indicate clinically significant functional improvement  Patient will report at least 20% reduction in pain.     Long Term Goals: 12 weeks   Patient will report at least 20% increase in functional capacity from initial LEFS score to indicate clinically significant functional improvement  Patient will report at least 20% increase on initial FOTO score to indicate clinically significant functional improvement  Patient will report at least 50% overall perceived improvement since start of therapy.  Patient will demonstrate equal ROM at B knees for improved functional mobility  Patient will demonstrate equal strength B LE's for improved ease with walking and functional balance     Plan     Outpatient Physical Therapy 2-3 times weekly for 12 weeks to include the following interventions: Gait Training, Manual Therapy, Moist Heat/ Ice, Neuromuscular Re-ed, Patient Education, Self Care, Therapeutic Activities, and Therapeutic Exercise.       Stan Rose, PT

## 2024-05-16 ENCOUNTER — CLINICAL SUPPORT (OUTPATIENT)
Dept: REHABILITATION | Facility: HOSPITAL | Age: 89
End: 2024-05-16
Payer: MEDICARE

## 2024-05-16 DIAGNOSIS — M25.561 BILATERAL CHRONIC KNEE PAIN: Primary | ICD-10-CM

## 2024-05-16 DIAGNOSIS — Z74.09 IMPAIRED FUNCTIONAL MOBILITY, BALANCE, GAIT, AND ENDURANCE: ICD-10-CM

## 2024-05-16 DIAGNOSIS — M25.562 BILATERAL CHRONIC KNEE PAIN: Primary | ICD-10-CM

## 2024-05-16 DIAGNOSIS — G89.29 BILATERAL CHRONIC KNEE PAIN: Primary | ICD-10-CM

## 2024-05-16 PROCEDURE — 97014 ELECTRIC STIMULATION THERAPY: CPT

## 2024-05-16 PROCEDURE — 97530 THERAPEUTIC ACTIVITIES: CPT

## 2024-05-16 PROCEDURE — 97110 THERAPEUTIC EXERCISES: CPT

## 2024-05-16 NOTE — PROGRESS NOTES
Physical Therapy Treatment Note     Name: Naomi Parra  Clinic Number: [id number]    Therapy Diagnosis:   Bilateral knee pain, weakness, gait disturance    Physician: Order, Paper    Visit Date: 5/16/2024    Physician Orders: PT Eval and Treat  Medical Diagnosis from Referral: Unilateral osteoarthritis right knee; Unilateral osteoarthritis left knee; blateral knee degernative joint disease, medial joint space collapse with bone on bone and varus positioning  Evaluation Date: 2/27/2024  Authorization Period Expiration: medically necessary  Plan of Care Expiration: 5/21/2024  Visit # / Visits authorized: visit #18    Time In: 1003  Time Out: 1153  Total Billable Time: 66 minutes    Surgery: none  Orthopedic Precautions: none  Pertinent History: see medical chart    Subjective: Naomi reports that she is better overall but feels she is up and down. The weather does not help with her knees. No pain in the hip when walking but with exercise it will hurt. Feels she can go up steps better but coming down still real difficult. Agrees to PT session.    Pain: 6/10; 5/10, 4/10  Location: Right hip/lateral leg; R knee; L knee    CMS Impairment/Limitation/Restriction for FOTO Survey  Therapist reviewed FOTO scores for Naomi Parra on 02/27/2024.   FOTO documents entered into [employer] - see Media section.     Intake: Patient's Physical FS Primary Measure: 31  Intake: Risk Adjusted Statistical FOTO: 42  Intake: Limitation Score: 69%  Category: Mobility  Intake:  KOOS, JR: 42.3% functional    4/4/2024: Patient's Physical FS Primary Measure: 31  Intake: Risk Adjusted Statistical FOTO: 42  4/4/2024: Limitation Score: 69%  4/4/2024: Category: Mobility  4/4/2024: KOOS, JR: 42.3% functional    4/30/2024: Patient's Physical FS Primary Measure: 50  Intake: Risk Adjusted Statistical FOTO: 42  4/30/2024: Limitation Score: 50%  4/30/2024: Category: Mobility  4/30/2024: KOOS, JR: 59.4% functional    Objective     Naomi received the following  treatment:     Time Activities   Manual Not done today DTM and trigger point release to the right IT band; passive stretch to the lateral hip, passive knee to chest, passive LTR   TherAct 13 Lateral side stepping along wall, navigating steps with rail   TherEx 38 quad sets, heel slides, SAQ, SLR, hip abd, hip add squeezes, bridges, clamshell, standing marches, standing heel raises, seated hip flex, seated hip abd, LAQ, mini squats, navigating steps   Gait     Neuro Re-ed     Modalities Not done Moist heat to both knees prior to exercise.   E-Stim 15 IFC with moist heat to the right hip and lateral leg, moist heat to both knees   Dry Needling     Canalith Repositioning         Home Exercises Provided and Patient Education Provided     Education provided:   -Plan of care, HEP, review with patient and she verbalizes completing them routinely at home.     Assessment   Naomi is a 88 y.o. female referred to outpatient Physical Therapy with a medical diagnosis of Unilateral osteoarthritis right knee; Unilateral osteoarthritis left knee; blateral knee degernative joint disease, medial joint space collapse with bone on bone and varus positioning. Patient presents with pain both knees with mild swellling, decreased strength both knees, decreased ROM both knees, limited functional mobility requiring the use of a 4 wheeled rolling walker all resulting in decreased functional capacity. Patient will benefit from skilled outpatient Physical Therapy to address the deficits stated above and in the chart below, provide education, and to maximize patient's level of independence.     Naomi continues to be up and down with right lateral leg and hip pain as well as the knees. Some days are better than others with the exercises. We continued with lateral side stepping along the wall and again navigated up and down 6 steps. She does struggle with coming down the steps. Weight bearing and weight shifting for some standing exercises that  require weight shift to one knee at a time gives her trouble. The e-stim was  beneficial with right lateral hip pain.We will continue with the plan of care and progress as tolerated.    Patient prognosis is Fair.      Anticipated barriers to physical therapy: age and long term nature of chronic B knee pain.     Goals: Naomi Is progressing well towards her goals.    Short Term Goals: 6 weeks   Patient will report at least 10% increase in functional capacity from initial LEFS score to indicate clinically significant functional improvement  Patient will report at least 10% increase on initial FOTO score to indicate clinically significant functional improvement  Patient will report at least 20% reduction in pain.     Long Term Goals: 12 weeks   Patient will report at least 20% increase in functional capacity from initial LEFS score to indicate clinically significant functional improvement  Patient will report at least 20% increase on initial FOTO score to indicate clinically significant functional improvement  Patient will report at least 50% overall perceived improvement since start of therapy.  Patient will demonstrate equal ROM at B knees for improved functional mobility  Patient will demonstrate equal strength B LE's for improved ease with walking and functional balance     Plan     Outpatient Physical Therapy 2-3 times weekly for 12 weeks to include the following interventions: Gait Training, Manual Therapy, Moist Heat/ Ice, Neuromuscular Re-ed, Patient Education, Self Care, Therapeutic Activities, and Therapeutic Exercise.       Stan Rose, PT

## 2024-05-21 ENCOUNTER — CLINICAL SUPPORT (OUTPATIENT)
Dept: REHABILITATION | Facility: HOSPITAL | Age: 89
End: 2024-05-21
Payer: MEDICARE

## 2024-05-21 DIAGNOSIS — Z74.09 IMPAIRED FUNCTIONAL MOBILITY, BALANCE, GAIT, AND ENDURANCE: ICD-10-CM

## 2024-05-21 DIAGNOSIS — M25.561 BILATERAL CHRONIC KNEE PAIN: Primary | ICD-10-CM

## 2024-05-21 DIAGNOSIS — G89.29 BILATERAL CHRONIC KNEE PAIN: Primary | ICD-10-CM

## 2024-05-21 DIAGNOSIS — M25.562 BILATERAL CHRONIC KNEE PAIN: Primary | ICD-10-CM

## 2024-05-21 PROCEDURE — 97530 THERAPEUTIC ACTIVITIES: CPT | Mod: KX

## 2024-05-21 PROCEDURE — 97110 THERAPEUTIC EXERCISES: CPT | Mod: KX

## 2024-05-21 NOTE — PROGRESS NOTES
Physical Therapy Treatment Note     Name: Naomi Parra  Clinic Number: [id number]    Therapy Diagnosis:   Bilateral knee pain, weakness, gait disturance    Physician: Order, Paper    Visit Date: 5/21/2024    Physician Orders: PT Eval and Treat  Medical Diagnosis from Referral: Unilateral osteoarthritis right knee; Unilateral osteoarthritis left knee; blateral knee degernative joint disease, medial joint space collapse with bone on bone and varus positioning  Evaluation Date: 2/27/2024  Authorization Period Expiration: medically necessary  Plan of Care Expiration: 5/21/2024  Visit # / Visits authorized: visit #18    Time In: 1045  Time Out: 1140  Total Billable Time: 45 minutes    Surgery: none  Orthopedic Precautions: none  Pertinent History: see medical chart    Subjective: Naomi reports that she had a bad weekend with her knees hurting but reports that they are better today. Agrees to PT session.    Pain: 0/10; 3/10, 4/10  Location: Right hip/lateral leg; R knee; L knee    CMS Impairment/Limitation/Restriction for FOTO Survey  Therapist reviewed FOTO scores for Naomi Parra on 02/27/2024.   FOTO documents entered into [employer] - see Media section.     Intake: Patient's Physical FS Primary Measure: 31  Intake: Risk Adjusted Statistical FOTO: 42  Intake: Limitation Score: 69%  Category: Mobility  Intake:  JR HUMAIRA: 42.3% functional    4/4/2024: Patient's Physical FS Primary Measure: 31  Intake: Risk Adjusted Statistical FOTO: 42  4/4/2024: Limitation Score: 69%  4/4/2024: Category: Mobility  4/4/2024: JR HUMAIRA: 42.3% functional    4/30/2024: Patient's Physical FS Primary Measure: 50  Intake: Risk Adjusted Statistical FOTO: 42  4/30/2024: Limitation Score: 50%  4/30/2024: Category: Mobility  4/30/2024: JR HUMAIRA: 59.4% functional    Objective     Naomi received the following treatment:     Time Activities   Manual Not done today DTM and trigger point release to the right IT band; passive stretch to the lateral hip,  passive knee to chest, passive LTR   TherAct 13 Lateral side stepping along wall, navigating steps with rail   TherEx 32 quad sets, heel slides, SAQ, SLR, hip abd, hip add squeezes, bridges, clamshell, standing marches, standing heel raises, seated hip flex, seated hip abd, LAQ, mini squats, navigating steps   Gait     Neuro Re-ed     Modalities 10 Moist heat to both knees prior to exercise.   E-Stim Not done IFC with moist heat to the right hip and lateral leg, moist heat to both knees   Dry Needling     Canalith Repositioning         Home Exercises Provided and Patient Education Provided     Education provided:   -Plan of care, HEP, review with patient and she verbalizes completing them routinely at home.     Assessment   Naomi is a 88 y.o. female referred to outpatient Physical Therapy with a medical diagnosis of Unilateral osteoarthritis right knee; Unilateral osteoarthritis left knee; blateral knee degernative joint disease, medial joint space collapse with bone on bone and varus positioning. Patient presents with pain both knees with mild swellling, decreased strength both knees, decreased ROM both knees, limited functional mobility requiring the use of a 4 wheeled rolling walker all resulting in decreased functional capacity. Patient will benefit from skilled outpatient Physical Therapy to address the deficits stated above and in the chart below, provide education, and to maximize patient's level of independence.     Naomi  Some days are better than others with the exercises. We continued with lateral side stepping along the wall and again navigated up and down 6 steps. She does struggle with coming down the steps but goes up pretty well. Weight bearing and weight shifting for some standing exercises that require weight shift to one knee at a time continues to give her trouble. We will continue with the plan of care and progress as tolerated.    Patient prognosis is Fair.      Anticipated barriers to physical  therapy: age and long term nature of chronic B knee pain.     Goals: Naomi Is progressing well towards her goals.    Short Term Goals: 6 weeks   Patient will report at least 10% increase in functional capacity from initial LEFS score to indicate clinically significant functional improvement  Patient will report at least 10% increase on initial FOTO score to indicate clinically significant functional improvement  Patient will report at least 20% reduction in pain.     Long Term Goals: 12 weeks   Patient will report at least 20% increase in functional capacity from initial LEFS score to indicate clinically significant functional improvement  Patient will report at least 20% increase on initial FOTO score to indicate clinically significant functional improvement  Patient will report at least 50% overall perceived improvement since start of therapy.  Patient will demonstrate equal ROM at B knees for improved functional mobility  Patient will demonstrate equal strength B LE's for improved ease with walking and functional balance     Plan     Outpatient Physical Therapy 2-3 times weekly for 12 weeks to include the following interventions: Gait Training, Manual Therapy, Moist Heat/ Ice, Neuromuscular Re-ed, Patient Education, Self Care, Therapeutic Activities, and Therapeutic Exercise.       Stan Rose, PT

## 2024-05-23 ENCOUNTER — CLINICAL SUPPORT (OUTPATIENT)
Dept: REHABILITATION | Facility: HOSPITAL | Age: 89
End: 2024-05-23
Payer: MEDICARE

## 2024-05-23 DIAGNOSIS — M25.561 BILATERAL CHRONIC KNEE PAIN: Primary | ICD-10-CM

## 2024-05-23 DIAGNOSIS — Z74.09 IMPAIRED FUNCTIONAL MOBILITY, BALANCE, GAIT, AND ENDURANCE: ICD-10-CM

## 2024-05-23 DIAGNOSIS — G89.29 BILATERAL CHRONIC KNEE PAIN: Primary | ICD-10-CM

## 2024-05-23 DIAGNOSIS — M25.562 BILATERAL CHRONIC KNEE PAIN: Primary | ICD-10-CM

## 2024-05-23 PROCEDURE — 97110 THERAPEUTIC EXERCISES: CPT | Mod: KX

## 2024-05-23 PROCEDURE — 97530 THERAPEUTIC ACTIVITIES: CPT | Mod: KX

## 2024-05-23 NOTE — PROGRESS NOTES
Physical Therapy Treatment Note     Name: Naomi Parra  Clinic Number: [id number]    Therapy Diagnosis:   Bilateral knee pain, weakness, gait disturance    Physician: Order, Paper    Visit Date: 5/23/2024    Physician Orders: PT Eval and Treat  Medical Diagnosis from Referral: Unilateral osteoarthritis right knee; Unilateral osteoarthritis left knee; blateral knee degernative joint disease, medial joint space collapse with bone on bone and varus positioning  Evaluation Date: 2/27/2024  Authorization Period Expiration: medically necessary  Plan of Care Expiration: 5/21/2024  Visit # / Visits authorized: visit #19    Time In: 0920  Time Out: 1007  Total Billable Time: 47 minutes    Surgery: none  Orthopedic Precautions: none  Pertinent History: see medical chart    Subjective: Naomi reports that both the knees are hurting today feels that the weather may be changing. Hip hurting as well. Agrees to PT session.    Pain: 0/10; 3/10, 4/10  Location: Right hip/lateral leg; R knee; L knee    CMS Impairment/Limitation/Restriction for FOTO Survey  Therapist reviewed FOTO scores for Naomi Parra on 02/27/2024.   FOTO documents entered into [employer] - see Media section.     Intake: Patient's Physical FS Primary Measure: 31  Intake: Risk Adjusted Statistical FOTO: 42  Intake: Limitation Score: 69%  Category: Mobility  Intake:  JR HUMAIRA: 42.3% functional    4/4/2024: Patient's Physical FS Primary Measure: 31  Intake: Risk Adjusted Statistical FOTO: 42  4/4/2024: Limitation Score: 69%  4/4/2024: Category: Mobility  4/4/2024: JR HUMAIRA: 42.3% functional    4/30/2024: Patient's Physical FS Primary Measure: 50  Intake: Risk Adjusted Statistical FOTO: 42  4/30/2024: Limitation Score: 50%  4/30/2024: Category: Mobility  4/30/2024: JR HUMAIRA: 59.4% functional    Objective     Naomi received the following treatment:     Time Activities   Manual Not done today DTM and trigger point release to the right IT band; passive stretch to the  lateral hip, passive knee to chest, passive LTR   TherAct 13 Lateral side stepping along wall, navigating steps with rail   TherEx 33 quad sets, ankle pumps, heel slides, SAQ, SLR, hip abd, hip add squeezes, bridges, clamshell, standing marches, standing heel raises, seated hip flex, seated hip abd, LAQ, mini squats, navigating steps   Gait     Neuro Re-ed     Modalities 10 Moist heat to both knees prior to exercise.   E-Stim Not done IFC with moist heat to the right hip and lateral leg, moist heat to both knees   Dry Needling     Canalith Repositioning         Home Exercises Provided and Patient Education Provided     Education provided:   -Plan of care, HEP, review with patient and she verbalizes completing them routinely at home.     Assessment   Naomi is a 88 y.o. female referred to outpatient Physical Therapy with a medical diagnosis of Unilateral osteoarthritis right knee; Unilateral osteoarthritis left knee; blateral knee degernative joint disease, medial joint space collapse with bone on bone and varus positioning. Patient presents with pain both knees with mild swellling, decreased strength both knees, decreased ROM both knees, limited functional mobility requiring the use of a 4 wheeled rolling walker all resulting in decreased functional capacity. Patient will benefit from skilled outpatient Physical Therapy to address the deficits stated above and in the chart below, provide education, and to maximize patient's level of independence.     Despite her pain Naomi did pretty well with the exercises. There were a couple of them where she was unable to complete the 20 reps. We continued with lateral side stepping along the wall and again navigated up and down 6 steps. She does struggle with coming down the steps but getting a little easier for her. Weight bearing and weight shifting for some standing exercises that require weight shift to one knee at a time continues to give her trouble. We will continue with  the plan of care and progress as tolerated.    Patient prognosis is Fair.      Anticipated barriers to physical therapy: age and long term nature of chronic B knee pain.     Goals: Naomi Is progressing well towards her goals.    Short Term Goals: 6 weeks   Patient will report at least 10% increase in functional capacity from initial LEFS score to indicate clinically significant functional improvement  Patient will report at least 10% increase on initial FOTO score to indicate clinically significant functional improvement  Patient will report at least 20% reduction in pain.     Long Term Goals: 12 weeks   Patient will report at least 20% increase in functional capacity from initial LEFS score to indicate clinically significant functional improvement  Patient will report at least 20% increase on initial FOTO score to indicate clinically significant functional improvement  Patient will report at least 50% overall perceived improvement since start of therapy.  Patient will demonstrate equal ROM at B knees for improved functional mobility  Patient will demonstrate equal strength B LE's for improved ease with walking and functional balance     Plan     Outpatient Physical Therapy 2-3 times weekly for 12 weeks to include the following interventions: Gait Training, Manual Therapy, Moist Heat/ Ice, Neuromuscular Re-ed, Patient Education, Self Care, Therapeutic Activities, and Therapeutic Exercise.       Stan Rose, PT

## 2024-05-28 ENCOUNTER — CLINICAL SUPPORT (OUTPATIENT)
Dept: REHABILITATION | Facility: HOSPITAL | Age: 89
End: 2024-05-28
Payer: MEDICARE

## 2024-05-28 DIAGNOSIS — M25.562 BILATERAL CHRONIC KNEE PAIN: Primary | ICD-10-CM

## 2024-05-28 DIAGNOSIS — M25.561 BILATERAL CHRONIC KNEE PAIN: Primary | ICD-10-CM

## 2024-05-28 DIAGNOSIS — G89.29 BILATERAL CHRONIC KNEE PAIN: Primary | ICD-10-CM

## 2024-05-28 DIAGNOSIS — Z74.09 IMPAIRED FUNCTIONAL MOBILITY, BALANCE, GAIT, AND ENDURANCE: ICD-10-CM

## 2024-05-28 PROCEDURE — 97110 THERAPEUTIC EXERCISES: CPT

## 2024-05-28 PROCEDURE — 97530 THERAPEUTIC ACTIVITIES: CPT

## 2024-05-29 NOTE — PROGRESS NOTES
Physical Therapy Treatment Note     Name: Naomi Parra  Clinic Number: [id number]    Therapy Diagnosis:   Bilateral knee pain, weakness, gait disturance    Physician: Order, Paper    Visit Date: 5/28/2024    Physician Orders: PT Eval and Treat  Medical Diagnosis from Referral: Unilateral osteoarthritis right knee; Unilateral osteoarthritis left knee; blateral knee degernative joint disease, medial joint space collapse with bone on bone and varus positioning  Evaluation Date: 2/27/2024  Authorization Period Expiration: medically necessary  Plan of Care Expiration: 5/21/2024  Visit # / Visits authorized: visit #21    Time In: 1051  Time Out: 1149  Total Billable Time: 48 minutes    Surgery: none  Orthopedic Precautions: none  Pertinent History: see medical chart    Subjective: Naomi reports that both the knees are hurting today and overall generally does not feel good. Hip hurting as well. Agrees to PT session.    Pain: 0/10; 3/10, 4/10  Location: Right hip/lateral leg; R knee; L knee    CMS Impairment/Limitation/Restriction for FOTO Survey  Therapist reviewed FOTO scores for Naomi Parra on 02/27/2024.   FOTO documents entered into [employer] - see Media section.     Intake: Patient's Physical FS Primary Measure: 31  Intake: Risk Adjusted Statistical FOTO: 42  Intake: Limitation Score: 69%  Category: Mobility  Intake:  JR HUMAIRA: 42.3% functional    4/4/2024: Patient's Physical FS Primary Measure: 31  Intake: Risk Adjusted Statistical FOTO: 42  4/4/2024: Limitation Score: 69%  4/4/2024: Category: Mobility  4/4/2024: JR HUMAIRA: 42.3% functional    4/30/2024: Patient's Physical FS Primary Measure: 50  Intake: Risk Adjusted Statistical FOTO: 42  4/30/2024: Limitation Score: 50%  4/30/2024: Category: Mobility  4/30/2024: JR HUMAIRA: 59.4% functional    Objective     Naomi received the following treatment:     Time Activities   Manual Not done today DTM and trigger point release to the right IT band; passive stretch to the  lateral hip, passive knee to chest, passive LTR   TherAct 15 Lateral side stepping along wall, navigating steps with rail   TherEx 33 quad sets, ankle pumps, heel slides, SAQ, SLR, hip abd, hip add squeezes, bridges, clamshell, standing marches, standing heel raises, seated hip flex, seated hip abd, LAQ, mini squats, navigating steps   Gait     Neuro Re-ed     Modalities 10 Moist heat to both knees prior to exercise.   E-Stim Not done IFC with moist heat to the right hip and lateral leg, moist heat to both knees   Dry Needling     Canalith Repositioning         Home Exercises Provided and Patient Education Provided     Education provided:   -Plan of care, HEP, review with patient and she verbalizes completing them routinely at home.     Assessment   Naomi is a 88 y.o. female referred to outpatient Physical Therapy with a medical diagnosis of Unilateral osteoarthritis right knee; Unilateral osteoarthritis left knee; blateral knee degernative joint disease, medial joint space collapse with bone on bone and varus positioning. Patient presents with pain both knees with mild swellling, decreased strength both knees, decreased ROM both knees, limited functional mobility requiring the use of a 4 wheeled rolling walker all resulting in decreased functional capacity. Patient will benefit from skilled outpatient Physical Therapy to address the deficits stated above and in the chart below, provide education, and to maximize patient's level of independence.     Effort today with the exercises were not good as in the past. She was emotional during the end of our session but did say what was the issue but stated not  related to pain. There were some exercises where she was unable to complete the 20 reps. We continued with lateral side stepping along the wall and again navigated up and down 6 steps. She does struggle with coming down the steps but getting a little easier for her. Weight bearing and weight shifting for some  standing exercises that require weight shift to one knee at a time continues to give her trouble. We will continue with the plan of care and progress as tolerated.    Patient prognosis is Fair.      Anticipated barriers to physical therapy: age and long term nature of chronic B knee pain.     Goals: Naomi Is progressing well towards her goals.    Short Term Goals: 6 weeks   Patient will report at least 10% increase in functional capacity from initial LEFS score to indicate clinically significant functional improvement  Patient will report at least 10% increase on initial FOTO score to indicate clinically significant functional improvement  Patient will report at least 20% reduction in pain.     Long Term Goals: 12 weeks   Patient will report at least 20% increase in functional capacity from initial LEFS score to indicate clinically significant functional improvement  Patient will report at least 20% increase on initial FOTO score to indicate clinically significant functional improvement  Patient will report at least 50% overall perceived improvement since start of therapy.  Patient will demonstrate equal ROM at B knees for improved functional mobility  Patient will demonstrate equal strength B LE's for improved ease with walking and functional balance     Plan     Outpatient Physical Therapy 2-3 times weekly for 12 weeks to include the following interventions: Gait Training, Manual Therapy, Moist Heat/ Ice, Neuromuscular Re-ed, Patient Education, Self Care, Therapeutic Activities, and Therapeutic Exercise.       Stan Rose, PT                                yes

## 2024-05-30 ENCOUNTER — CLINICAL SUPPORT (OUTPATIENT)
Dept: REHABILITATION | Facility: HOSPITAL | Age: 89
End: 2024-05-30
Payer: MEDICARE

## 2024-05-30 DIAGNOSIS — M25.561 BILATERAL CHRONIC KNEE PAIN: Primary | ICD-10-CM

## 2024-05-30 DIAGNOSIS — G89.29 BILATERAL CHRONIC KNEE PAIN: Primary | ICD-10-CM

## 2024-05-30 DIAGNOSIS — Z74.09 IMPAIRED FUNCTIONAL MOBILITY, BALANCE, GAIT, AND ENDURANCE: ICD-10-CM

## 2024-05-30 DIAGNOSIS — M25.562 BILATERAL CHRONIC KNEE PAIN: Primary | ICD-10-CM

## 2024-05-30 PROCEDURE — 97530 THERAPEUTIC ACTIVITIES: CPT

## 2024-05-30 PROCEDURE — 97110 THERAPEUTIC EXERCISES: CPT

## 2024-05-30 NOTE — PROGRESS NOTES
"  Physical Therapy Treatment Note     Name: Naomi Parra  Clinic Number: [id number]    Therapy Diagnosis:   Bilateral knee pain, weakness, gait disturance    Physician: Order, Paper    Visit Date: 5/30/2024    Physician Orders: PT Eval and Treat  Medical Diagnosis from Referral: Unilateral osteoarthritis right knee; Unilateral osteoarthritis left knee; blateral knee degernative joint disease, medial joint space collapse with bone on bone and varus positioning  Evaluation Date: 2/27/2024  Authorization Period Expiration: medically necessary  Plan of Care Expiration: 5/21/2024  Visit # / Visits authorized: visit #22    Time In: 0915  Time Out: 1018  Total Billable Time: 53 minutes    Surgery: none  Orthopedic Precautions: none  Pertinent History: see medical chart    Subjective: Naomi reports that both the knees are not too bad today but states "they are the best that they will get, I'm too old for knee replacements." She is pleased with her progress and states that she never thought that she could improve to this point. She feels it is a good time to discharge. Agrees to PT session.    Pain: 2/10; 4/10, 3/10  Location: Right hip/lateral leg; R knee; L knee    CMS Impairment/Limitation/Restriction for FOTO Survey  Therapist reviewed FOTO scores for Naomi Parra on 02/27/2024.   FOTO documents entered into EP[employer] - see Media section.     Intake: Patient's Physical FS Primary Measure: 31  Intake: Risk Adjusted Statistical FOTO: 42  Intake: Limitation Score: 69%  Category: Mobility  Intake:  KOOS, JR: 42.3% functional    4/4/2024: Patient's Physical FS Primary Measure: 31  Intake: Risk Adjusted Statistical FOTO: 42  4/4/2024: Limitation Score: 69%  4/4/2024: Category: Mobility  4/4/2024: KOOS, JR: 42.3% functional    4/30/2024: Patient's Physical FS Primary Measure: 50  Intake: Risk Adjusted Statistical FOTO: 42  4/30/2024: Limitation Score: 50%  4/30/2024: Category: Mobility  4/30/2024: KOOS, JR: 59.4% " functional    5/30/2024: Patient's Physical FS Primary Measure: 56  Intake: Risk Adjusted Statistical FOTO: 42  5/30/2024:  Limitation Score: 44%  5/30/2024:  Category: Mobility  5/30/2024: JR HUMAIRA: 63.8% functional    Objective     Naomi received the following treatment:     Time Activities   Manual Not done today DTM and trigger point release to the right IT band; passive stretch to the lateral hip, passive knee to chest, passive LTR   TherAct 15 Lateral side stepping along wall, navigating steps with rail   TherEx 38 quad sets, ankle pumps, heel slides, SAQ, SLR, hip abd, hip add squeezes, bridges, clamshell, standing marches, standing heel raises, seated hip flex, seated hip abd, LAQ, mini squats, navigating steps   Gait     Neuro Re-ed     Modalities 10 Moist heat to both knees prior to exercise.   E-Stim Not done IFC with moist heat to the right hip and lateral leg, moist heat to both knees   Dry Needling     Canalith Repositioning         Home Exercises Provided and Patient Education Provided     Education provided:   -Plan of care, HEP, review with patient and she verbalizes completing them routinely at home.     Assessment   Naomi is a 88 y.o. female referred to outpatient Physical Therapy with a medical diagnosis of Unilateral osteoarthritis right knee; Unilateral osteoarthritis left knee; blateral knee degernative joint disease, medial joint space collapse with bone on bone and varus positioning. Patient presents with pain both knees with mild swellling, decreased strength both knees, decreased ROM both knees, limited functional mobility requiring the use of a 4 wheeled rolling walker all resulting in decreased functional capacity. Patient will benefit from skilled outpatient Physical Therapy to address the deficits stated above and in the chart below, provide education, and to maximize patient's level of independence.     Naomi has completed 22 visits of physical therapy and appears to have reached her  maximum in terms of function. She has made some good progress during therapy evident by her improved score on the functional outcome tool going from 69% limitation to 44%. Her effort today was much improved. She completed all exercises only a some issues with pain but did well. with We continued with lateral side stepping along the wall and again navigated up and down 6 steps. She does struggle with coming down the steps but getting a little easier for her. Weight bearing and weight shifting still give her trouble at times but is improved. Discharge physical therapy services in favor of home exercise program.    Patient prognosis is Fair.      Anticipated barriers to physical therapy: age and long term nature of chronic B knee pain.     Goals: Naomi Is progressing well towards her goals.    Short Term Goals: 6 weeks   Patient will report at least 10% increase in functional capacity from initial LEFS score to indicate clinically significant functional improvement-Goal met  Patient will report at least 10% increase on initial FOTO score to indicate clinically significant functional improvement-Goal met  Patient will report at least 20% reduction in pain.-Goal met     Long Term Goals: 12 weeks   Patient will report at least 20% increase in functional capacity from initial LEFS score to indicate clinically significant functional improvement-Goal met  Patient will report at least 20% increase on initial FOTO score to indicate clinically significant functional improvement-Goal met  Patient will report at least 50% overall perceived improvement since start of therapy.-Not met  Patient will demonstrate equal ROM at B knees for improved functional mobility-Partially met  Patient will demonstrate equal strength B LE's for improved ease with walking and functional balance-Partially met    Plan     Discharge physical therapy services in favor of home exercise program.    Stan Rose, PT

## 2024-08-20 ENCOUNTER — OFFICE VISIT (OUTPATIENT)
Dept: FAMILY MEDICINE | Facility: CLINIC | Age: 89
End: 2024-08-20
Payer: MEDICARE

## 2024-08-20 DIAGNOSIS — K64.9 HEMORRHOIDS, UNSPECIFIED HEMORRHOID TYPE: ICD-10-CM

## 2024-08-20 DIAGNOSIS — N30.00 ACUTE CYSTITIS WITHOUT HEMATURIA: ICD-10-CM

## 2024-08-20 DIAGNOSIS — N95.2 ATROPHIC VAGINITIS: ICD-10-CM

## 2024-08-20 PROCEDURE — 99204 OFFICE O/P NEW MOD 45 MIN: CPT | Mod: ,,, | Performed by: NURSE PRACTITIONER

## 2024-08-21 VITALS
RESPIRATION RATE: 20 BRPM | HEART RATE: 92 BPM | SYSTOLIC BLOOD PRESSURE: 169 MMHG | TEMPERATURE: 98 F | WEIGHT: 179 LBS | DIASTOLIC BLOOD PRESSURE: 78 MMHG

## 2024-08-21 PROBLEM — N95.2 ATROPHIC VAGINITIS: Status: ACTIVE | Noted: 2024-08-21

## 2024-08-21 PROBLEM — N30.00 ACUTE CYSTITIS WITHOUT HEMATURIA: Status: ACTIVE | Noted: 2024-08-21

## 2024-08-21 PROBLEM — K64.9 HEMORRHOIDS: Status: ACTIVE | Noted: 2024-08-21

## 2024-08-21 RX ORDER — MECLIZINE HCL 12.5 MG 12.5 MG/1
12.5 TABLET ORAL 3 TIMES DAILY PRN
COMMUNITY

## 2024-08-21 RX ORDER — SULFAMETHOXAZOLE AND TRIMETHOPRIM 800; 160 MG/1; MG/1
1 TABLET ORAL 2 TIMES DAILY
Qty: 14 TABLET | Refills: 0 | Status: SHIPPED | OUTPATIENT
Start: 2024-08-21 | End: 2024-08-28

## 2024-08-21 RX ORDER — ALPRAZOLAM 0.25 MG/1
0.25 TABLET ORAL 2 TIMES DAILY PRN
COMMUNITY

## 2024-08-21 RX ORDER — PANTOPRAZOLE SODIUM 40 MG/1
40 TABLET, DELAYED RELEASE ORAL DAILY
COMMUNITY

## 2024-08-21 RX ORDER — NEBIVOLOL 5 MG/1
5 TABLET ORAL NIGHTLY
COMMUNITY

## 2024-08-21 RX ORDER — POTASSIUM CHLORIDE 750 MG/1
10 CAPSULE, EXTENDED RELEASE ORAL ONCE
COMMUNITY

## 2024-08-21 RX ORDER — ESTRADIOL 0.1 MG/G
1 CREAM VAGINAL
Qty: 12 G | Refills: 11 | Status: SHIPPED | OUTPATIENT
Start: 2024-08-21 | End: 2025-08-21

## 2024-08-21 RX ORDER — FUROSEMIDE 20 MG/1
20 TABLET ORAL DAILY
COMMUNITY

## 2024-08-21 RX ORDER — LEVOTHYROXINE SODIUM 75 UG/1
75 TABLET ORAL
COMMUNITY

## 2024-08-21 RX ORDER — HYDROCORTISONE ACETATE 25 MG/1
25 SUPPOSITORY RECTAL 2 TIMES DAILY
Qty: 20 SUPPOSITORY | Refills: 0 | Status: SHIPPED | OUTPATIENT
Start: 2024-08-21 | End: 2024-08-31

## 2024-08-21 NOTE — PROGRESS NOTES
Subjective:       Patient ID: Naomi Parra is a 89 y.o. female.    Chief Complaint:  Burning on urination    The patient is an 89-year-old female who presents complaining of burning on urination.  The patient thinks it is vaginal irritation.  The patient does have urinary incontinence and lies in bed at night with a perineal pad.      Review of [id number] point review of systems conducted, negative except as stated in the history of present illness. See HPI for details.      Objective:      Visit Vitals  BP (!) 169/78   Pulse 92   Temp 98.3 °F (36.8 °C)   Resp 20   Wt 81.2 kg (179 lb)     Physical Exam  Vitals and nursing note reviewed. Exam conducted with a chaperone present.   HENT:      Head: Normocephalic.      Right Ear: Tympanic membrane normal.      Left Ear: Tympanic membrane normal.      Nose: Nose normal.      Mouth/Throat:      Mouth: Mucous membranes are moist.   Eyes:      Extraocular Movements: Extraocular movements intact.   Cardiovascular:      Rate and Rhythm: Normal rate and regular rhythm.      Heart sounds: No murmur heard.  Pulmonary:      Effort: Pulmonary effort is normal.      Breath sounds: Normal breath sounds.   Abdominal:      General: Bowel sounds are normal.      Palpations: Abdomen is soft.      Hernia: There is no hernia in the right inguinal area.   Genitourinary:     General: Normal vulva.      Exam position: Lithotomy position.      Pubic Area: No rash.       Labia:         Right: No rash or tenderness.         Left: No rash or tenderness.       Comments: Patient has scant amount of redness near the vagina the patient has several large and swollen the irritated looking hemorrhoids  Musculoskeletal:         General: Normal range of motion.   Lymphadenopathy:      Lower Body: No right inguinal adenopathy.   Skin:     General: Skin is warm and dry.   Neurological:      Mental Status: She is alert and oriented to person, place, and time.       Current Outpatient Medications    Medication Instructions    ALPRAZolam (XANAX) 0.25 mg, Oral, 2 times daily PRN    estradioL (ESTRACE) 1 g, Vaginal, Three times weekly    furosemide (LASIX) 20 mg, Oral, Daily, PRN edema    hydrocortisone (ANUSOL-HC) 25 mg, Rectal, 2 times daily    levothyroxine (SYNTHROID) 75 mcg, Oral, Before breakfast    meclizine (ANTIVERT) 12.5 mg, Oral, 3 times daily PRN    nebivoloL (BYSTOLIC) 5 mg, Oral, Nightly    pantoprazole (PROTONIX) 40 mg, Oral, Daily    potassium chloride (MICRO-K) 10 MEQ CpSR 10 mEq, Oral, Once    sulfamethoxazole-trimethoprim 800-160mg (BACTRIM DS) 800-160 mg Tab 1 tablet, Oral, 2 times daily     is allergic to statins-hmg-coa reductase inhibitors.       Assessment:         ICD-10-CM ICD-9-CM   1. Acute cystitis without hematuria  N30.00 595.0   2. Atrophic vaginitis  N95.2 627.3   3. Hemorrhoids, unspecified hemorrhoid type  K64.9 455.6          Plan:       1. Acute cystitis without hematuria  Drink 4-6 8 oz glasses water daily  Avoid tub baths  Wipe front to back  Wear cotton underwear  - sulfamethoxazole-trimethoprim 800-160mg (BACTRIM DS) 800-160 mg Tab; Take 1 tablet by mouth 2 (two) times daily. for 7 days  Dispense: 14 tablet; Refill: 0    2. Atrophic vaginitis  - estradioL (ESTRACE) 0.01 % (0.1 mg/gram) vaginal cream; Place 1 g vaginally 3 (three) times a week.  Dispense: 12 g; Refill: 11    3. Hemorrhoids, unspecified hemorrhoid type  - hydrocortisone (ANUSOL-HC) 25 mg suppository; Place 1 suppository (25 mg total) rectally 2 (two) times daily. for 10 days  Dispense: 20 suppository; Refill: 0        Follow up if symptoms worsen or fail to improve.     No future appointments.

## 2024-10-15 ENCOUNTER — LAB VISIT (OUTPATIENT)
Dept: LAB | Facility: HOSPITAL | Age: 89
End: 2024-10-15
Attending: INTERNAL MEDICINE
Payer: MEDICARE

## 2024-10-15 DIAGNOSIS — I10 HYPERTENSION, UNSPECIFIED TYPE: ICD-10-CM

## 2024-10-15 DIAGNOSIS — Z00.01 ENCOUNTER FOR GENERAL ADULT MEDICAL EXAMINATION WITH ABNORMAL FINDINGS: ICD-10-CM

## 2024-10-15 DIAGNOSIS — E78.2 MIXED HYPERLIPIDEMIA: ICD-10-CM

## 2024-10-15 DIAGNOSIS — I51.9 MYXEDEMA HEART DISEASE: Primary | ICD-10-CM

## 2024-10-15 DIAGNOSIS — K21.9 GASTROESOPHAGEAL REFLUX DISEASE, UNSPECIFIED WHETHER ESOPHAGITIS PRESENT: ICD-10-CM

## 2024-10-15 DIAGNOSIS — E03.9 MYXEDEMA HEART DISEASE: Primary | ICD-10-CM

## 2024-10-15 LAB
ALBUMIN SERPL-MCNC: 3.7 G/DL (ref 3.4–4.8)
ALBUMIN/GLOB SERPL: 1.1 RATIO (ref 1.1–2)
ALP SERPL-CCNC: 48 UNIT/L (ref 40–150)
ALT SERPL-CCNC: 9 UNIT/L (ref 0–55)
ANION GAP SERPL CALC-SCNC: 8 MEQ/L
AST SERPL-CCNC: 15 UNIT/L (ref 5–34)
BASOPHILS # BLD AUTO: 0.06 X10(3)/MCL
BASOPHILS NFR BLD AUTO: 0.9 %
BILIRUB SERPL-MCNC: 0.8 MG/DL
BUN SERPL-MCNC: 15 MG/DL (ref 9.8–20.1)
CALCIUM SERPL-MCNC: 10.2 MG/DL (ref 8.4–10.2)
CHLORIDE SERPL-SCNC: 106 MMOL/L (ref 98–107)
CHOLEST SERPL-MCNC: 198 MG/DL
CHOLEST/HDLC SERPL: 3 {RATIO} (ref 0–5)
CO2 SERPL-SCNC: 29 MMOL/L (ref 23–31)
CREAT SERPL-MCNC: 0.99 MG/DL (ref 0.55–1.02)
CREAT/UREA NIT SERPL: 15
EOSINOPHIL # BLD AUTO: 0.3 X10(3)/MCL (ref 0–0.9)
EOSINOPHIL NFR BLD AUTO: 4.5 %
ERYTHROCYTE [DISTWIDTH] IN BLOOD BY AUTOMATED COUNT: 13.4 % (ref 11.5–17)
GFR SERPLBLD CREATININE-BSD FMLA CKD-EPI: 55 ML/MIN/1.73/M2
GLOBULIN SER-MCNC: 3.3 GM/DL (ref 2.4–3.5)
GLUCOSE SERPL-MCNC: 98 MG/DL (ref 82–115)
HCT VFR BLD AUTO: 40.8 % (ref 37–47)
HDLC SERPL-MCNC: 64 MG/DL (ref 35–60)
HGB BLD-MCNC: 13.2 G/DL (ref 12–16)
IMM GRANULOCYTES # BLD AUTO: 0.03 X10(3)/MCL (ref 0–0.04)
IMM GRANULOCYTES NFR BLD AUTO: 0.5 %
LDLC SERPL CALC-MCNC: 117 MG/DL (ref 50–140)
LYMPHOCYTES # BLD AUTO: 1.52 X10(3)/MCL (ref 0.6–4.6)
LYMPHOCYTES NFR BLD AUTO: 22.8 %
MCH RBC QN AUTO: 29.5 PG (ref 27–31)
MCHC RBC AUTO-ENTMCNC: 32.4 G/DL (ref 33–36)
MCV RBC AUTO: 91.1 FL (ref 80–94)
MONOCYTES # BLD AUTO: 0.59 X10(3)/MCL (ref 0.1–1.3)
MONOCYTES NFR BLD AUTO: 8.9 %
NEUTROPHILS # BLD AUTO: 4.16 X10(3)/MCL (ref 2.1–9.2)
NEUTROPHILS NFR BLD AUTO: 62.4 %
NRBC BLD AUTO-RTO: 0 %
PLATELET # BLD AUTO: 355 X10(3)/MCL (ref 130–400)
PMV BLD AUTO: 9.5 FL (ref 7.4–10.4)
POTASSIUM SERPL-SCNC: 4.3 MMOL/L (ref 3.5–5.1)
PROT SERPL-MCNC: 7 GM/DL (ref 5.8–7.6)
RBC # BLD AUTO: 4.48 X10(6)/MCL (ref 4.2–5.4)
SODIUM SERPL-SCNC: 143 MMOL/L (ref 136–145)
T3FREE SERPL-MCNC: 2.36 PG/ML (ref 1.58–3.91)
T4 FREE SERPL-MCNC: 1.23 NG/DL (ref 0.7–1.48)
TRIGL SERPL-MCNC: 87 MG/DL (ref 37–140)
TSH SERPL-ACNC: 1.6 UIU/ML (ref 0.35–4.94)
VLDLC SERPL CALC-MCNC: 17 MG/DL
WBC # BLD AUTO: 6.66 X10(3)/MCL (ref 4.5–11.5)

## 2024-10-15 PROCEDURE — 84443 ASSAY THYROID STIM HORMONE: CPT

## 2024-10-15 PROCEDURE — 84481 FREE ASSAY (FT-3): CPT

## 2024-10-15 PROCEDURE — 36415 COLL VENOUS BLD VENIPUNCTURE: CPT

## 2024-10-15 PROCEDURE — 84439 ASSAY OF FREE THYROXINE: CPT

## 2024-10-15 PROCEDURE — 80061 LIPID PANEL: CPT

## 2024-10-15 PROCEDURE — 85025 COMPLETE CBC W/AUTO DIFF WBC: CPT

## 2024-10-15 PROCEDURE — 80053 COMPREHEN METABOLIC PANEL: CPT

## 2025-04-12 ENCOUNTER — HOSPITAL ENCOUNTER (EMERGENCY)
Facility: HOSPITAL | Age: OVER 89
Discharge: HOME | End: 2025-04-12
Attending: FAMILY MEDICINE
Payer: MEDICARE

## 2025-04-12 VITALS
HEIGHT: 61 IN | TEMPERATURE: 99 F | OXYGEN SATURATION: 95 % | WEIGHT: 175 LBS | DIASTOLIC BLOOD PRESSURE: 85 MMHG | RESPIRATION RATE: 16 BRPM | HEART RATE: 97 BPM | SYSTOLIC BLOOD PRESSURE: 152 MMHG | BODY MASS INDEX: 33.04 KG/M2

## 2025-04-12 DIAGNOSIS — I10 HYPERTENSION, UNSPECIFIED TYPE: ICD-10-CM

## 2025-04-12 DIAGNOSIS — S09.90XA INJURY OF HEAD, INITIAL ENCOUNTER: ICD-10-CM

## 2025-04-12 DIAGNOSIS — S40.012A CONTUSION OF LEFT SHOULDER, INITIAL ENCOUNTER: Primary | ICD-10-CM

## 2025-04-12 PROCEDURE — 25000003 PHARM REV CODE 250: Performed by: FAMILY MEDICINE

## 2025-04-12 PROCEDURE — 99284 EMERGENCY DEPT VISIT MOD MDM: CPT | Mod: 25

## 2025-04-12 RX ORDER — IBUPROFEN 400 MG/1
400 TABLET ORAL
Status: COMPLETED | OUTPATIENT
Start: 2025-04-12 | End: 2025-04-12

## 2025-04-12 RX ORDER — CLONIDINE HYDROCHLORIDE 0.1 MG/1
0.1 TABLET ORAL
Status: COMPLETED | OUTPATIENT
Start: 2025-04-12 | End: 2025-04-12

## 2025-04-12 RX ORDER — MAGNESIUM HYDROXIDE 400 MG/5ML
SUSPENSION, ORAL (FINAL DOSE FORM) ORAL ONCE
COMMUNITY

## 2025-04-12 RX ADMIN — IBUPROFEN 400 MG: 400 TABLET, FILM COATED ORAL at 10:04

## 2025-04-12 RX ADMIN — CLONIDINE HYDROCHLORIDE 0.1 MG: 0.1 TABLET ORAL at 10:04

## 2025-04-13 NOTE — ED NOTES
89 year old female presented per EMS status post fall.  States she was attempting to sit and the chair was not under her.  States she did strike her head but denies loc.  No posterior neck pain but complains of left shoulder pain that radiates to left lateral neck.

## 2025-04-13 NOTE — ED PROVIDER NOTES
"Encounter Date: 4/12/2025       History     Chief Complaint   Patient presents with    Shoulder Pain     Patient states she fell while attempting to sit and injured left shoulder     Pt was sitting down, "missed the seat", and hit her left shoulder and head. No LOC, no n/v.    The history is provided by the patient and the EMS personnel.     Review of patient's allergies indicates:   Allergen Reactions    Statins-hmg-coa reductase inhibitors      History reviewed. No pertinent past medical history.  History reviewed. No pertinent surgical history.  No family history on file.  Social History[1]  Review of Systems   Constitutional:  Negative for fever.   HENT:  Negative for sore throat.    Respiratory:  Negative for shortness of breath.    Cardiovascular:  Negative for chest pain.   Gastrointestinal:  Negative for nausea.   Genitourinary:  Negative for dysuria.   Musculoskeletal:  Negative for back pain.   Skin:  Negative for rash.   Neurological:  Negative for weakness.   Hematological:  Does not bruise/bleed easily.   All other systems reviewed and are negative.      Physical Exam     Initial Vitals [04/12/25 2113]   BP Pulse Resp Temp SpO2   (!) 197/98 95 16 98.5 °F (36.9 °C) 98 %      MAP       --         Physical Exam    Nursing note and vitals reviewed.  Constitutional: She appears well-developed and well-nourished.   HENT:   Head: Normocephalic and atraumatic. Mouth/Throat: No oropharyngeal exudate.   No hematoma noted.   Eyes: Conjunctivae and EOM are normal. Pupils are equal, round, and reactive to light.   Neck: Neck supple.   Non tender   Normal range of motion.  Cardiovascular:  Normal rate, regular rhythm and normal heart sounds.           Pulmonary/Chest: Breath sounds normal. No respiratory distress. She has no wheezes.   Abdominal: Abdomen is soft. Bowel sounds are normal. There is no abdominal tenderness.   Musculoskeletal:         General: No edema. Normal range of motion.      Cervical back: Normal " range of motion and neck supple.      Comments: Left shoulder: mild posterior tender, full ROM.     Lymphadenopathy:     She has no cervical adenopathy.   Neurological: She is alert and oriented to person, place, and time. She has normal strength and normal reflexes. She displays normal reflexes. No cranial nerve deficit or sensory deficit. GCS score is 15. GCS eye subscore is 4. GCS verbal subscore is 5. GCS motor subscore is 6.   Skin: Skin is warm and dry. Capillary refill takes less than 2 seconds.   Psychiatric: She has a normal mood and affect.         ED Course   Procedures  Labs Reviewed - No data to display       Imaging Results              X-Ray Shoulder 2 or More Views Left (Final result)  Result time 04/12/25 21:43:41      Final result by Miguel Fleming MD (04/12/25 21:43:41)                   Impression:      No acute osseous abnormality identified.      Electronically signed by: Miguel Fleming  Date:    04/12/2025  Time:    21:43               Narrative:    EXAMINATION:  XR SHOULDER COMPLETE 2 OR MORE VIEWS LEFT    CLINICAL HISTORY:  trauma;    TECHNIQUE:  Three-view    COMPARISON:  None avail.    FINDINGS:  There are mild degenerative arthritic changes.  Articular surface alignment is preserved.  No acute fracture or dislocation identified.                                       CT Head Without Contrast (Final result)  Result time 04/12/25 21:40:02      Final result by Miguel Flmeing MD (04/12/25 21:40:02)                   Impression:      No acute intracranial findings identified.      Electronically signed by: Miguel Fleming  Date:    04/12/2025  Time:    21:40               Narrative:    EXAMINATION:  CT HEAD WITHOUT CONTRAST    CLINICAL HISTORY:  Head trauma, minor (Age >= 65y);    TECHNIQUE:  Sequential axial images were performed of the brain without contrast.    Dose length product was 814 mGycm. Automated exposure control was utilized to minimize radiation dose.    COMPARISON:  None  available    FINDINGS:  There is no intracranial mass effect, midline shift, hydrocephalus or hemorrhage. There is no sulcal effacement or low attenuation changes to suggest recent large vessel territory infarction. Chronic microangiopathic ischemic changes are mild.  The ventricular system and sulcal markings prominence is consistent with atrophy. There is no acute extra axial fluid collection.  There is no acute depressed calvarial fracture.  Visualized paranasal sinuses are clear without mucosal thickening, polypoidal abnormality or air-fluid levels. Mastoid air cells aeration is optimal.                                       Medications   ibuprofen tablet 400 mg (400 mg Oral Given 4/12/25 2215)   cloNIDine tablet 0.1 mg (0.1 mg Oral Given 4/12/25 2215)     Medical Decision Making  Bp improved to 155/60, pain improved.    Amount and/or Complexity of Data Reviewed  Radiology: ordered. Decision-making details documented in ED Course.     Details: CT head no acute  Xray left shoulder no acute.    Risk  Prescription drug management.               ED Course as of 04/12/25 2314   Sat Apr 12, 2025 2137 X-Ray Shoulder 2 or More Views Left [RB]      ED Course User Index  [RB] Ashok Reyes MD                           Clinical Impression:  Final diagnoses:  [S40.012A] Contusion of left shoulder, initial encounter (Primary)  [S09.90XA] Injury of head, initial encounter  [I10] Hypertension, unspecified type          ED Disposition Condition    Discharge Stable          ED Prescriptions    None       Follow-up Information       Follow up With Specialties Details Why Contact Info    Lola Roca MD Internal Medicine Call  As needed [address] [facility]  [place] [address]  [phone]                 [1]         Ashok Reyes MD  04/12/25 2314

## 2025-05-27 ENCOUNTER — LAB VISIT (OUTPATIENT)
Dept: LAB | Facility: HOSPITAL | Age: OVER 89
End: 2025-05-27
Attending: INTERNAL MEDICINE
Payer: MEDICARE

## 2025-05-27 DIAGNOSIS — I51.9 MYXEDEMA HEART DISEASE: ICD-10-CM

## 2025-05-27 DIAGNOSIS — K21.9 GASTROESOPHAGEAL REFLUX DISEASE, UNSPECIFIED WHETHER ESOPHAGITIS PRESENT: ICD-10-CM

## 2025-05-27 DIAGNOSIS — E78.2 MIXED HYPERLIPIDEMIA: ICD-10-CM

## 2025-05-27 DIAGNOSIS — I10 ESSENTIAL HYPERTENSION, MALIGNANT: Primary | ICD-10-CM

## 2025-05-27 DIAGNOSIS — E03.9 MYXEDEMA HEART DISEASE: ICD-10-CM

## 2025-05-27 LAB
ALBUMIN SERPL-MCNC: 3.8 G/DL (ref 3.4–4.8)
ALBUMIN/GLOB SERPL: 1.2 RATIO (ref 1.1–2)
ALP SERPL-CCNC: 44 UNIT/L (ref 40–150)
ALT SERPL-CCNC: 11 UNIT/L (ref 0–55)
ANION GAP SERPL CALC-SCNC: 10 MEQ/L
AST SERPL-CCNC: 19 UNIT/L (ref 11–45)
BASOPHILS # BLD AUTO: 0.07 X10(3)/MCL
BASOPHILS NFR BLD AUTO: 1.1 %
BILIRUB SERPL-MCNC: 0.7 MG/DL
BUN SERPL-MCNC: 15 MG/DL (ref 9.8–20.1)
CALCIUM SERPL-MCNC: 9.7 MG/DL (ref 8.4–10.2)
CHLORIDE SERPL-SCNC: 106 MMOL/L (ref 98–107)
CHOLEST SERPL-MCNC: 181 MG/DL
CHOLEST/HDLC SERPL: 3 {RATIO} (ref 0–5)
CO2 SERPL-SCNC: 27 MMOL/L (ref 23–31)
CREAT SERPL-MCNC: 0.79 MG/DL (ref 0.55–1.02)
CREAT/UREA NIT SERPL: 19
EOSINOPHIL # BLD AUTO: 0.29 X10(3)/MCL (ref 0–0.9)
EOSINOPHIL NFR BLD AUTO: 4.4 %
ERYTHROCYTE [DISTWIDTH] IN BLOOD BY AUTOMATED COUNT: 12.9 % (ref 11.5–17)
GFR SERPLBLD CREATININE-BSD FMLA CKD-EPI: >60 ML/MIN/1.73/M2
GLOBULIN SER-MCNC: 3.3 GM/DL (ref 2.4–3.5)
GLUCOSE SERPL-MCNC: 85 MG/DL (ref 82–115)
HCT VFR BLD AUTO: 40.6 % (ref 37–47)
HDLC SERPL-MCNC: 64 MG/DL (ref 35–60)
HGB BLD-MCNC: 13.2 G/DL (ref 12–16)
IMM GRANULOCYTES # BLD AUTO: 0.02 X10(3)/MCL (ref 0–0.04)
IMM GRANULOCYTES NFR BLD AUTO: 0.3 %
LDLC SERPL CALC-MCNC: 107 MG/DL (ref 50–140)
LYMPHOCYTES # BLD AUTO: 1.41 X10(3)/MCL (ref 0.6–4.6)
LYMPHOCYTES NFR BLD AUTO: 21.2 %
MCH RBC QN AUTO: 29.9 PG (ref 27–31)
MCHC RBC AUTO-ENTMCNC: 32.5 G/DL (ref 33–36)
MCV RBC AUTO: 91.9 FL (ref 80–94)
MONOCYTES # BLD AUTO: 0.5 X10(3)/MCL (ref 0.1–1.3)
MONOCYTES NFR BLD AUTO: 7.5 %
NEUTROPHILS # BLD AUTO: 4.37 X10(3)/MCL (ref 2.1–9.2)
NEUTROPHILS NFR BLD AUTO: 65.5 %
NRBC BLD AUTO-RTO: 0 %
PLATELET # BLD AUTO: 366 X10(3)/MCL (ref 130–400)
PMV BLD AUTO: 9.9 FL (ref 7.4–10.4)
POTASSIUM SERPL-SCNC: 3.9 MMOL/L (ref 3.5–5.1)
PROT SERPL-MCNC: 7.1 GM/DL (ref 5.8–7.6)
RBC # BLD AUTO: 4.42 X10(6)/MCL (ref 4.2–5.4)
SODIUM SERPL-SCNC: 143 MMOL/L (ref 136–145)
T3FREE SERPL-MCNC: 2.48 PG/ML (ref 1.58–3.91)
T4 FREE SERPL-MCNC: 1.28 NG/DL (ref 0.7–1.48)
TRIGL SERPL-MCNC: 52 MG/DL (ref 37–140)
TSH SERPL-ACNC: 1.92 UIU/ML (ref 0.35–4.94)
VLDLC SERPL CALC-MCNC: 10 MG/DL
WBC # BLD AUTO: 6.66 X10(3)/MCL (ref 4.5–11.5)

## 2025-05-27 PROCEDURE — 80053 COMPREHEN METABOLIC PANEL: CPT

## 2025-05-27 PROCEDURE — 85025 COMPLETE CBC W/AUTO DIFF WBC: CPT

## 2025-05-27 PROCEDURE — 84481 FREE ASSAY (FT-3): CPT

## 2025-05-27 PROCEDURE — 80061 LIPID PANEL: CPT

## 2025-05-27 PROCEDURE — 36415 COLL VENOUS BLD VENIPUNCTURE: CPT

## 2025-05-27 PROCEDURE — 84443 ASSAY THYROID STIM HORMONE: CPT

## 2025-05-27 PROCEDURE — 84439 ASSAY OF FREE THYROXINE: CPT
